# Patient Record
Sex: FEMALE | Race: WHITE | NOT HISPANIC OR LATINO | Employment: FULL TIME | URBAN - METROPOLITAN AREA
[De-identification: names, ages, dates, MRNs, and addresses within clinical notes are randomized per-mention and may not be internally consistent; named-entity substitution may affect disease eponyms.]

---

## 2017-02-14 ENCOUNTER — TELEPHONE (OUTPATIENT)
Dept: FAMILY MEDICINE | Facility: CLINIC | Age: 42
End: 2017-02-14

## 2017-02-14 NOTE — LETTER
[address] [address]  [phone]        February 20, 2017    Carolina TERRELL Roula  [address] [facility] [address]          To whom it may concern.,    We are appealing the denial due to the patient has tried Proair HFA and I did choose YES on the form saying this but I still got a denial.  Please see second page of the prior auth I submitted saying yes to that question    Sincerely,        Jim Royal M.D.

## 2017-02-14 NOTE — TELEPHONE ENCOUNTER
Drug not covered/PA required on Ventolin([id number]) Insurance covers Proair but patient said she failed on Proair.  Pt Insurance [employer]  Phone number for help desk [phone]  Pt I.D. Number [id number]  Group-[id number]  PCN-ADV  Bin#[id number]  Please advise pharmacy upon approval/denial.  Thanks,  Nikki Avila [facility].

## 2017-05-22 ENCOUNTER — TELEPHONE (OUTPATIENT)
Dept: FAMILY MEDICINE | Facility: CLINIC | Age: 42
End: 2017-05-22

## 2017-05-22 NOTE — TELEPHONE ENCOUNTER
Spoke with patient and gathered information per DW.  Warm to the touch only when there is swelling which comes and goes.  It is only her right leg.  No streak but is red when it's swollen.  Her brother has had blood clot after getting hit with a softball.  Her swelling came on 05/22/17 around 2:30 pm at work and is now going down (05/22/17 5:40pm).  Her swelling of her leg has been going on for a year and a half.  I spoke with Dr. Meyer and he told me to tell her that she can have a baby aspirin and do some leg pumps.  She was scheduled for 05/26/17 and is now scheduled for 05/23/17.  Ishaan Herrera, CMA

## 2017-05-23 ENCOUNTER — HOSPITAL ENCOUNTER (OUTPATIENT)
Dept: ULTRASOUND IMAGING | Facility: CLINIC | Age: 42
Discharge: HOME | End: 2017-05-23
Attending: FAMILY MEDICINE | Admitting: FAMILY MEDICINE
Payer: COMMERCIAL

## 2017-05-23 ENCOUNTER — OFFICE VISIT (OUTPATIENT)
Dept: FAMILY MEDICINE | Facility: CLINIC | Age: 42
End: 2017-05-23
Payer: COMMERCIAL

## 2017-05-23 VITALS
HEIGHT: 65 IN | RESPIRATION RATE: 14 BRPM | WEIGHT: 128 LBS | DIASTOLIC BLOOD PRESSURE: 68 MMHG | OXYGEN SATURATION: 99 % | TEMPERATURE: 97.6 F | HEART RATE: 83 BPM | SYSTOLIC BLOOD PRESSURE: 108 MMHG | BODY MASS INDEX: 21.33 KG/M2

## 2017-05-23 DIAGNOSIS — J45.20 INTERMITTENT ASTHMA, UNCOMPLICATED: ICD-10-CM

## 2017-05-23 DIAGNOSIS — M79.89 RIGHT LEG SWELLING: Primary | ICD-10-CM

## 2017-05-23 DIAGNOSIS — M79.89 RIGHT LEG SWELLING: ICD-10-CM

## 2017-05-23 LAB
D DIMER PPP FEU-MCNC: 0.3 UG/ML FEU (ref 0–0.5)
ERYTHROCYTE [SEDIMENTATION RATE] IN BLOOD BY WESTERGREN METHOD: 5 MM/H (ref 0–20)

## 2017-05-23 PROCEDURE — 85379 FIBRIN DEGRADATION QUANT: CPT | Performed by: FAMILY MEDICINE

## 2017-05-23 PROCEDURE — 93971 EXTREMITY STUDY: CPT | Mod: RT

## 2017-05-23 PROCEDURE — 99214 OFFICE O/P EST MOD 30 MIN: CPT | Performed by: FAMILY MEDICINE

## 2017-05-23 PROCEDURE — 36415 COLL VENOUS BLD VENIPUNCTURE: CPT | Performed by: FAMILY MEDICINE

## 2017-05-23 PROCEDURE — 85652 RBC SED RATE AUTOMATED: CPT | Performed by: FAMILY MEDICINE

## 2017-05-23 RX ORDER — ALBUTEROL SULFATE 90 UG/1
2 AEROSOL, METERED RESPIRATORY (INHALATION) EVERY 4 HOURS PRN
Qty: 1 INHALER | Refills: 6 | Status: SHIPPED | OUTPATIENT
Start: 2017-05-23 | End: 2018-01-05

## 2017-05-23 ASSESSMENT — PAIN SCALES - GENERAL: PAINLEVEL: MILD PAIN (2)

## 2017-05-23 NOTE — PROGRESS NOTES
"  SUBJECTIVE:                                                    Carolina Celeste is a 41 year old female who presents to clinic today for the following health issues:    Swelling in calf on right leg comes and goes a year now and this time its lasting a week last night it got really big thought it was going to explode.    Carolina is here today for right leg swelling.  Stated that she has had it on and off for more than a year, it was very bad in the last week. Last night, it was swelling badly and was feeling like it was exploding and painful.  Has jorge horse pain on the back of the right calf.  No knee pain.  Feel little better today. Stated that she feels like there is a knot on the calf where she has the pain.  Pain is worse with prolonged sitting. 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  Not recent hx of flying.  No smoking and is not on estrogen.  Brother had blood clot at 44, after trauma.  Genetic work up was negative.  Pain has gotten worse in the last couple weeks which was intermittently but now she has constant dull achy pain.  No unusal activities.  No hx of knee injury.  No back or hip pain.  Last night she felt stabbing the pain in the epigastric area that last for about 20 minutes. Mainly in the lower sternal and epigastric area. No bloating.  Also felt SOB and was feeling like \"I can't have enough oxygen\" when breathing.  Get nervous and anxious.  Also was sweating all night long last night. No hx of anxiety.  Known to have asthma - and her sob is different from her normal sob from asthma. No N/V/D/C. No URI symptoms. No ST or sinus pressure/pain. Known to have anxiety but she does not think she has anxiety attack.    Problem list and histories reviewed & adjusted, as indicated.  Additional history: as documented    Current Outpatient Prescriptions   Medication Sig Dispense Refill     VENTOLIN  (90 BASE) MCG/ACT Inhaler Inhale 2 puffs into the lungs every 4 hours as needed for shortness of " "breath / dyspnea or wheezing . Appointment needed for additional refills. 1 Inhaler 6     [DISCONTINUED] albuterol (VENTOLIN HFA) 108 (90 BASE) MCG/ACT inhaler Inhale 2 puffs into the lungs every 4 hours as needed for shortness of breath / dyspnea or wheezing . Appointment needed for additional refills. 1 Inhaler 6     Allergies   Allergen Reactions     Sunflower Seed [Sunflower Oil]      Throat itching, wheezing     Latex        Reviewed and updated as needed this visit by clinical staff  Tobacco  Allergies  Meds  Soc Hx      Reviewed and updated as needed this visit by Provider         ROS:  Constitutional, HEENT, cardiovascular, pulmonary, gi and gu systems are negative, except as otherwise noted.    OBJECTIVE:                                                    /68 (BP Location: Right arm, Patient Position: Chair, Cuff Size: Adult Regular)  Pulse 83  Temp 97.6  F (36.4  C) (Temporal)  Resp 14  Ht 5' 5\" (1.651 m)  Wt 128 lb (58.1 kg)  LMP 03/07/2009  SpO2 99%  BMI 21.3 kg/m2  Body mass index is 21.3 kg/(m^2).  GENERAL: healthy, alert and no distress. Speak in full sentences.  HENT: ear canals and TM's normal. Nares are non-congested. Oropharynx is pink and moist. No tender with palpation to the sinuses.  NECK: no adenopathy, supple and thyroid normal to palpation  RESP: lungs clear to auscultation - no rales, rhonchi or wheezes.  Good respiratory effort with good air movement throughout.  No tender with palpation to the chest wall.  CV: regular rate and rhythm, no murmur, no peripheral edema and peripheral pulses strong  ABDOMEN: soft, nontender, non-distended and bowel sounds normal. No tender with palpation to the epigastric area.   MS: no gross musculoskeletal defects noted, no edema.  Walks without limping.  No swelling on the legs and both calves are symmetrical in size.  Tender with palpation to the right calf medially and behind the the calf.  Negative Holmer's test.  Knees and ankles exams " were normal.  Strong pedal pulses bilaterally.  No tender with palpation to the thighs.  SKIN: no suspicious lesions or rashes  NEURO: Normal strength and tone, mentation intact and speech normal.  Cranial nerve 2-12 are intact. No focal neurological deficit. DTR + through out.  PSYCH: mentation appears normal, affect normal/bright.      Diagnostic Test Results:  Results for orders placed or performed in visit on 05/23/17 (from the past 24 hour(s))   D dimer, quantitative   Result Value Ref Range    D Dimer 0.3 0.0 - 0.50 ug/ml FEU   ESR: Erythrocyte sedimentation rate   Result Value Ref Range    Sed Rate 5 0 - 20 mm/h        Received a call from the Ultrasound tech - ultrasound was negative for DVT.    ASSESSMENT/PLAN:                                                        ICD-10-CM    1. Right leg swelling M79.89 D dimer, quantitative     ESR: Erythrocyte sedimentation rate     US Lower Extremity Venous Duplex Right     CANCELED: US Lower Extremity Venous Duplex Bilateral   2. Intermittent asthma, uncomplicated J45.20 VENTOLIN  (90 BASE) MCG/ACT Inhaler     Carolina presented with right leg swelling with chest pain and SOB.  Has been having the leg swelling on and off for years. No risk identified for DVT/PE except of daily long distance driving.  She also has a brother who had PE after trauma with negative genetic work up. Physical exam is not suggestive of DVT, more suggestive of Baker cyst or ruptured Baker cyst.  Labs as ordered and ultrasound was ordered to evaluate for DVT.    Labs showed normal sed rate and D-dimer.  Ultrasound was negative for DVT.  With negative D-dimer, unlikely she has PE. He chest pain and SOB could be due to anxiety attack.  The ultrasound tech reported that she was feeling SOB and therefore she was asked to go to the ER for further evaluation.      Ventolin inhaler refilled to be taken as needed for asthma symptoms.      Justina Dhaliwal Mai, MD  [facility]

## 2017-05-23 NOTE — NURSING NOTE
"Chief Complaint   Patient presents with     Leg Swelling       Initial /68 (BP Location: Right arm, Patient Position: Chair, Cuff Size: Adult Regular)  Pulse 83  Temp 97.6  F (36.4  C) (Temporal)  Resp 14  Ht 5' 5\" (1.651 m)  Wt 128 lb (58.1 kg)  LMP 03/07/2009  SpO2 99%  BMI 21.3 kg/m2 Estimated body mass index is 21.3 kg/(m^2) as calculated from the following:    Height as of this encounter: 5' 5\" (1.651 m).    Weight as of this encounter: 128 lb (58.1 kg).  Medication Reconciliation: complete     Temi Aj MA 5/23/2017        "

## 2017-05-23 NOTE — MR AVS SNAPSHOT
"              After Visit Summary   5/23/2017    Carolina Celeste    MRN: [id number]           Patient Information     Date Of Birth          1975        Visit Information        Provider Department      5/23/2017 3:20 PM Justina Skinner MD [facility]        Today's Diagnoses     Right leg swelling    -  1    Intermittent asthma, uncomplicated           Follow-ups after your visit        Follow-up notes from your care team     Return if symptoms worsen or fail to improve.      Future tests that were ordered for you today     Open Future Orders        Priority Expected Expires Ordered    US Lower Extremity Venous Duplex Right Routine  5/23/2018 5/23/2017            Who to contact     If you have questions or need follow up information about today's clinic visit or your schedule please contact [facility] directly at [phone].  Normal or non-critical lab and imaging results will be communicated to you by MyChart, letter or phone within 4 business days after the clinic has received the results. If you do not hear from us within 7 days, please contact the clinic through MyChart or phone. If you have a critical or abnormal lab result, we will notify you by phone as soon as possible.  Submit refill requests through [email or web address] or call your pharmacy and they will forward the refill request to us. Please allow 3 business days for your refill to be completed.          Additional Information About Your Visit        MyChart Information     [email or web address] lets you send messages to your doctor, view your test results, renew your prescriptions, schedule appointments and more. To sign up, go to [email or web address] . Click on \"Log in\" on the left side of the screen, which will take you to the Welcome page. Then click on \"Sign up Now\" on the right side of the page.     You will be asked to enter the access code listed below, as well as some personal information. Please follow the directions to create " "your username and password.     Your access code is: [email or web address]  Expires: 2017  8:56 PM     Your access code will  in 90 days. If you need help or a new code, please call your [facility] clinic or [phone].        Care EveryWhere ID     This is your Care EveryWhere ID. This could be used by other organizations to access your [facility] medical records  [id number]        Your Vitals Were     Pulse Temperature Respirations Height Last Period Pulse Oximetry    83 97.6  F (36.4  C) (Temporal) 14 5' 5\" (1.651 m) 2009 99%    BMI (Body Mass Index)                   21.3 kg/m2            Blood Pressure from Last 3 Encounters:   17 108/68   16 110/80   16 110/72    Weight from Last 3 Encounters:   17 128 lb (58.1 kg)   16 121 lb (54.9 kg)   07/10/16 120 lb (54.4 kg)              We Performed the Following     D dimer, quantitative     ESR: Erythrocyte sedimentation rate          Today's Medication Changes          These changes are accurate as of: 17  8:56 PM.  If you have any questions, ask your nurse or doctor.               Start taking these medicines.        Dose/Directions    VENTOLIN  (90 BASE) MCG/ACT Inhaler   Used for:  Intermittent asthma, uncomplicated   Generic drug:  albuterol   Replaces:  albuterol 108 (90 BASE) MCG/ACT Inhaler   Started by:  Justina Skinner MD        Dose:  2 puff   Inhale 2 puffs into the lungs every 4 hours as needed for shortness of breath / dyspnea or wheezing . Appointment needed for additional refills.   Quantity:  1 Inhaler   Refills:  6         Stop taking these medicines if you haven't already. Please contact your care team if you have questions.     albuterol 108 (90 BASE) MCG/ACT Inhaler   Commonly known as:  VENTOLIN HFA   Replaced by:  VENTOLIN  (90 BASE) MCG/ACT Inhaler   Stopped by:  Justina Skinner MD                Where to get your medicines      Some of these will need a paper prescription and others can be " bought over the counter.  Ask your nurse if you have questions.     Bring a paper prescription for each of these medications     VENTOLIN  (90 BASE) MCG/ACT Inhaler                Primary Care Provider Office Phone # Fax #    Florence Barrera PA-C [phone]       [address] [place] [address]        Thank you!     Thank you for choosing [facility]  for your care. Our goal is always to provide you with excellent care. Hearing back from our patients is one way we can continue to improve our services. Please take a few minutes to complete the written survey that you may receive in the mail after your visit with us. Thank you!             Your Updated Medication List - Protect others around you: Learn how to safely use, store and throw away your medicines at www.disposemymeds.org.          This list is accurate as of: 5/23/17  8:56 PM.  Always use your most recent med list.                   Brand Name Dispense Instructions for use    VENTOLIN  (90 BASE) MCG/ACT Inhaler   Generic drug:  albuterol     1 Inhaler    Inhale 2 puffs into the lungs every 4 hours as needed for shortness of breath / dyspnea or wheezing . Appointment needed for additional refills.

## 2017-05-24 ENCOUNTER — TELEPHONE (OUTPATIENT)
Dept: FAMILY MEDICINE | Facility: CLINIC | Age: 42
End: 2017-05-24

## 2017-05-24 ENCOUNTER — OFFICE VISIT (OUTPATIENT)
Dept: FAMILY MEDICINE | Facility: CLINIC | Age: 42
End: 2017-05-24
Payer: COMMERCIAL

## 2017-05-24 VITALS
WEIGHT: 127.6 LBS | HEART RATE: 84 BPM | SYSTOLIC BLOOD PRESSURE: 112 MMHG | TEMPERATURE: 98.3 F | RESPIRATION RATE: 20 BRPM | OXYGEN SATURATION: 100 % | DIASTOLIC BLOOD PRESSURE: 76 MMHG | BODY MASS INDEX: 21.23 KG/M2

## 2017-05-24 DIAGNOSIS — R06.02 SHORTNESS OF BREATH: Primary | ICD-10-CM

## 2017-05-24 DIAGNOSIS — R07.89 ATYPICAL CHEST PAIN: ICD-10-CM

## 2017-05-24 PROCEDURE — 99214 OFFICE O/P EST MOD 30 MIN: CPT | Performed by: FAMILY MEDICINE

## 2017-05-24 ASSESSMENT — PAIN SCALES - GENERAL: PAINLEVEL: MODERATE PAIN (4)

## 2017-05-24 NOTE — PROGRESS NOTES
"  SUBJECTIVE:                                                    Carolina Celeste is a 41 year old female who presents to clinic today for the following health issues:    Chief Complaint   Patient presents with     Shortness of Breath     states that it started 05/22/2017, it began with swelling in the calves.  She states that when she has shortness of breath she gets a pain that goes up into her neck,  She states that she does have asthma but it doensn't feel like this.  She did see Dr. kruger yesterday.  Had a US on her right leg.  States that her breathing has gotten worse since yesterday.      Other     walking stats were 97% and Pulse at 110        Cough  Several days worsening MEJIA  \"chest expanding, lungs not filling\" can't catch breath  Better with albuterol minutes, asthma hospitalizations  L, now R rib pain, sharp, lasts few sec's  Pain also there all the time, more intense with breathing, radiates into the R chest and r neck  Waking from sleep  r leg swelling, better today. Pain in R calf post  Started Monday 230, better by 4  Breathing is episodic, has to make up breathing      ROS:  Constitutional, HEENT, cardiovascular, pulmonary, gi and gu systems are negative, except as otherwise noted.    OBJECTIVE:                                                    /76 (BP Location: Right arm, Patient Position: Chair, Cuff Size: Adult Regular)  Pulse 84  Temp 98.3  F (36.8  C) (Temporal)  Resp 20  Wt 127 lb 9.6 oz (57.9 kg)  LMP 03/07/2009  SpO2 100%  BMI 21.23 kg/m2  Body mass index is 21.23 kg/(m^2).    Well, anxious. Neck supple. No simon. No tm. Lucius pharynx normal. Heart rrr, no m/r/g. Lungs clear, unlabored. abd benign. Ext's no c/c/e. No tenderness. Neuro non focal.     Reviewed her labs from yesterday and US.     Diagnostic Test Results:  none      ASSESSMENT/PLAN:                                                        ICD-10-CM    1. Shortness of breath R06.02    2. Atypical chest pain R07.89  " "    After I obtained a history and finished her exam I started explaining what workup I would suggest we pursue. I started discussing an EKG, which she immediately said \"this is not my heart, I don't want to pay for a heart workup\". She grew quite emotional. She seemed irritated by her discussion. I asked her what's wrong, and she said \"you think I'm making this up\". I reassured her that I take these type of symptoms very seriously and that's why would suggest a workup that would include imaging and lab work today. She became tearful and again asserted that I didn't believe her complaints and the left the exam room abruptly. We spent over 25 minutes together going over all of the above items, but unfortunately was unable to discuss a treatment plan..    Von Alvarez MD  [facility]     "

## 2017-05-24 NOTE — NURSING NOTE
"Chief Complaint   Patient presents with     Shortness of Breath     states that it started 05/22/2017, it began with swelling in the calves.  She states that when she has shortness of breath she gets a pain that goes up into her neck,  She states that she does have asthma but it doensn't feel like this.  She did see Dr. kruger yesterday.  Had a US on her right leg.  States that her breathing has gotten worse since yesterday.        Initial /76 (BP Location: Right arm, Patient Position: Chair, Cuff Size: Adult Regular)  Pulse 84  Temp 98.3  F (36.8  C) (Temporal)  Resp 20  Wt 127 lb 9.6 oz (57.9 kg)  LMP 03/07/2009  SpO2 100%  BMI 21.23 kg/m2 Estimated body mass index is 21.23 kg/(m^2) as calculated from the following:    Height as of 5/23/17: 5' 5\" (1.651 m).    Weight as of this encounter: 127 lb 9.6 oz (57.9 kg).  Medication Reconciliation: complete   Niki Caro CMA     "

## 2017-05-24 NOTE — MR AVS SNAPSHOT
"              After Visit Summary   5/24/2017    Carolina Celeste    MRN: [id number]           Patient Information     Date Of Birth          1975        Visit Information        Provider Department      5/24/2017 3:00 PM Von Alvarez MD [facility]        Today's Diagnoses     Shortness of breath    -  1    Atypical chest pain           Follow-ups after your visit        Future tests that were ordered for you today     Open Future Orders        Priority Expected Expires Ordered    US Lower Extremity Venous Duplex Right Routine  5/23/2018 5/23/2017            Who to contact     If you have questions or need follow up information about today's clinic visit or your schedule please contact [facility] directly at [phone].  Normal or non-critical lab and imaging results will be communicated to you by MyChart, letter or phone within 4 business days after the clinic has received the results. If you do not hear from us within 7 days, please contact the clinic through MyChart or phone. If you have a critical or abnormal lab result, we will notify you by phone as soon as possible.  Submit refill requests through [employer] or call your pharmacy and they will forward the refill request to us. Please allow 3 business days for your refill to be completed.          Additional Information About Your Visit        MyChart Information     [employer] lets you send messages to your doctor, view your test results, renew your prescriptions, schedule appointments and more. To sign up, go to [email or web address] . Click on \"Log in\" on the left side of the screen, which will take you to the Welcome page. Then click on \"Sign up Now\" on the right side of the page.     You will be asked to enter the access code listed below, as well as some personal information. Please follow the directions to create your username and password.     Your access code is: [email or web address]  Expires: 8/21/2017  8:56 PM   "   Your access code will  in 90 days. If you need help or a new code, please call your [facility] clinic or [phone].        Care EveryWhere ID     This is your Care EveryWhere ID. This could be used by other organizations to access your [facility] medical records  [id number]        Your Vitals Were     Pulse Temperature Respirations Last Period Pulse Oximetry BMI (Body Mass Index)    84 98.3  F (36.8  C) (Temporal) 20 2009 100% 21.23 kg/m2       Blood Pressure from Last 3 Encounters:   17 112/76   17 108/68   16 110/80    Weight from Last 3 Encounters:   17 127 lb 9.6 oz (57.9 kg)   17 128 lb (58.1 kg)   16 121 lb (54.9 kg)              Today, you had the following     No orders found for display       Primary Care Provider Office Phone # Fax #    Florence Barrera PA-C [phone]       [address] [place] [address]        Thank you!     Thank you for choosing [facility]  for your care. Our goal is always to provide you with excellent care. Hearing back from our patients is one way we can continue to improve our services. Please take a few minutes to complete the written survey that you may receive in the mail after your visit with us. Thank you!             Your Updated Medication List - Protect others around you: Learn how to safely use, store and throw away your medicines at www.disposemymeds.org.          This list is accurate as of: 17  5:39 PM.  Always use your most recent med list.                   Brand Name Dispense Instructions for use    VENTOLIN  (90 BASE) MCG/ACT Inhaler   Generic drug:  albuterol     1 Inhaler    Inhale 2 puffs into the lungs every 4 hours as needed for shortness of breath / dyspnea or wheezing . Appointment needed for additional refills.

## 2017-05-24 NOTE — TELEPHONE ENCOUNTER
": 1975  PHONE #'s: [phone] (home) NONE (work)    PRESENTING PROBLEM:  Still having issues from yesterday and now has a cough.     NURSING ASSESSMENT  Description:   \" I am asthmatic and this is not regular asthma attack. \"  New Sx since yesterday. Having some groin pain today, too. '   Onset/duration:  3 days.   Precip. factors:   Etiology unknown.   Assoc. Sx:   ' I feel like I can't get enough air, Feel lightheaded at times.   Improves/worsens Sx:  Worse today. , but leg feels better.   Pain scale (1-10)   Headache since yesterday,   Sx specific meds:  Albuteral, baby ASA per day since Monday.   LMP/preg/breast feeding:  Hx of Hysterectomy.   Last exam/Tx:   17 with DR. Skinner    RECOMMENDED DISPOSITION:  See in 24 hours - Wants to be seen today. Scheduled hernan with DR. Alvarez today at 3 PM.  Will comply with recommendation: YES   If further questions/concerns or if Sx do not improve, worsen or new Sx develop, call your PCP or [facility] as soon as possible.    NOTES:  Disposition was determined by the first positive assessment question, therefore all previous assessment questions were negative.  Informed to check provider manual or call insurance company to assure coverage.    Guideline used: Breathing Problems  Telephone Triage Protocols for Nurses, Fifth Edition, Steffany Collazo RN    "

## 2017-05-25 ENCOUNTER — TELEPHONE (OUTPATIENT)
Dept: FAMILY MEDICINE | Facility: CLINIC | Age: 42
End: 2017-05-25

## 2017-05-25 NOTE — TELEPHONE ENCOUNTER
Per Dr. Skinner, patient is already aware of the results and was seen by Dr. Alvarez on 5/24/17.  Conor Braga CMA

## 2017-05-25 NOTE — TELEPHONE ENCOUNTER
Tried to reach patient, left message for patient to call the clinic back.  Conor Braga, [facility]

## 2017-05-25 NOTE — TELEPHONE ENCOUNTER
----- Message from Justina Dhaliwal Mai, MD sent at 5/24/2017  1:53 PM CDT -----  Please let patient know that her d-dimer level was normal as well as her sedimentation rate.  If she continues to have the shortness of breath, please follow up for further evaluation.  Go to the ER if develops chest pain, shortness of breath or if there is any concerns.

## 2017-05-25 NOTE — TELEPHONE ENCOUNTER
----- Message from Justina Dhaliwal Mai, MD sent at 5/24/2017  2:14 PM CDT -----  Please let patient know that her ultrasound to evaluate for DVT was normal.

## 2017-07-27 ENCOUNTER — OFFICE VISIT (OUTPATIENT)
Dept: FAMILY MEDICINE | Facility: OTHER | Age: 42
End: 2017-07-27
Payer: COMMERCIAL

## 2017-07-27 ENCOUNTER — TELEPHONE (OUTPATIENT)
Dept: FAMILY MEDICINE | Facility: OTHER | Age: 42
End: 2017-07-27

## 2017-07-27 VITALS
WEIGHT: 125.6 LBS | TEMPERATURE: 98.2 F | HEIGHT: 65 IN | SYSTOLIC BLOOD PRESSURE: 124 MMHG | HEART RATE: 94 BPM | DIASTOLIC BLOOD PRESSURE: 60 MMHG | BODY MASS INDEX: 20.93 KG/M2 | RESPIRATION RATE: 16 BRPM

## 2017-07-27 DIAGNOSIS — F43.21 GRIEF REACTION: Primary | ICD-10-CM

## 2017-07-27 DIAGNOSIS — F41.1 GENERALIZED ANXIETY DISORDER: ICD-10-CM

## 2017-07-27 PROCEDURE — 99213 OFFICE O/P EST LOW 20 MIN: CPT | Performed by: FAMILY MEDICINE

## 2017-07-27 RX ORDER — BUPROPION HYDROCHLORIDE 150 MG/1
150 TABLET ORAL EVERY MORNING
Qty: 30 TABLET | Refills: 1 | Status: SHIPPED | OUTPATIENT
Start: 2017-07-27 | End: 2017-11-29

## 2017-07-27 RX ORDER — HYDROXYZINE HYDROCHLORIDE 25 MG/1
25-50 TABLET, FILM COATED ORAL EVERY 6 HOURS PRN
Qty: 30 TABLET | Refills: 1 | Status: SHIPPED | OUTPATIENT
Start: 2017-07-27 | End: 2019-11-27

## 2017-07-27 ASSESSMENT — PAIN SCALES - GENERAL: PAINLEVEL: NO PAIN (0)

## 2017-07-27 NOTE — TELEPHONE ENCOUNTER
Reason for call:  Patient reporting a symptom    Symptom or request: anxiety / panic attaches     Duration (how long have symptoms been present): ?    Have you been treated for this before? No    Additional comments: anxiety / transferred patient to RN    Phone Number patient can be reached at:  Home number on file [phone] (home) or Cell number on file:    Telephone Information:   Mobile [phone]       Best Time:  Any time    Can we leave a detailed message on this number:  YES    Call taken on 7/27/2017 at 1:11 PM by Rosemary Delgadillo

## 2017-07-27 NOTE — PATIENT INSTRUCTIONS
Grief Reaction  Grief is the feeling that we all have when we lose someone or something that has been important in our life. Grief is an unavoidable and normal reaction to this loss, and can last from months to years. The amount of time depends on different factors. These include how close the person was to you, and how much support you have through the grief process. Symptoms can be both physical and emotional.  Physical reactions:    Loss of appetite or overeating    Changes in weight    Trouble getting to sleep or staying asleep    Hair loss    Upset stomach, indigestion, heart burn, abdominal pain, cramping, diarrhea    Sense of trouble breathing    Trembling, shakiness  Emotional reactions:    Sadness    Anxiety    Feeling depressed or helpless    Difficulty concentrating    Detachment or withdrawal from those around you    Loss of interest in your normal life and work  Home care    Allow yourself to feel the pain of your loss. For some, this can be a key part of healing grief. Talk about your pain with others who understand. Share good memories that involve the person, pet, or possession  you lost.    Take time for yourself. Make it a point to do things that you enjoy (gardening, walking in nature, going to a movie, etc.).    Take care of your physical body. Eat a balanced diet (low in saturated fat and high in fruits and vegetables) and establish an exercise plan at least 3 times a week for 30 minutes. Even mild-moderate exercise (like brisk walking) can make you feel better. Get plenty of sleep.    Avoid the use of alcohol and drugs to cover your emotional pain. This only slows down the emotional healing process.    Do not isolate yourself from others. Have daily contact with family or friends. Talk about your loss to those closest to you.    For additional support, meet with your //rabbi, a counselor or therapist, or your own doctor.    Consider joining a grief support group. Ask your doctor  or our staff for information on how to find one in your area.    If you have been prescribed a medicine to help with your symptoms, take it only as directed. Do not use it with alcohol.  Follow-up care  Follow up with your healthcare provider, or as advised.  Call 911  Call 911 if any of these occur:    Trouble breathing    Very confused    Very drowsy or trouble awakening    Fainting or loss of consciousness    Rapid heart rate    Seizure    New chest pain that becomes more severe, lasts longer, or spreads into your shoulder, arm, neck, jaw or back  When to seek medical advice  Call your healthcare provider right away if any of these occur:    Worsening symptoms    Unable to eat or sleep for three days in a row    Feeling extreme depression, fear, anxiety, or anger toward yourself or others    Feeling out of control    Feeling that you may try to harm yourself    family or friends express concern over your behavior and ask you to get help  Date Last Reviewed: 9/29/2015 2000-2017 [employer]. [address] [place] [address]. All rights reserved. This information is not intended as a substitute for professional medical care. Always follow your healthcare professional's instructions.        Treating Anxiety Disorders with Therapy    If you have an anxiety disorder, you don t have to suffer anymore. Treatment is available. Therapy (also called counseling) is often a helpful treatment for anxiety disorders. With therapy, a specially trained professional (therapist) helps you face and learn to manage your anxiety. Therapy can be short-term or long-term depending on your needs. In some cases, medicine may also be prescribed with therapy. It may take time before you notice how much therapy is helping, but stick with it. With therapy, you can feel better.  Cognitive behavioral therapy (CBT)  Cognitive behavioral therapy (CBT) teaches you to manage anxiety. It does this by helping you understand  how you think and act when you re anxious. Research has shown CBT to be a very effective treatment for anxiety disorders. How CBT is run is almost like a class. It involves homework and activities to build skills that teach you to cope with anxiety step by step. It can be done in a group or one-on-one, and often takes place for a set number of sessions. CBT has two main parts:    Cognitive therapy helps you identify the negative, irrational thoughts that occur with your anxiety. You ll learn to replace these with more positive, realistic thoughts.    Behavioral therapy helps you change how you react to anxiety. You ll learn coping skills and methods for relaxing to help you better deal with anxiety.  Other forms of therapy  Other therapy methods may work better for you than CBT. Or, you may move from CBT to another form of therapy as your treatment needs change. This may mean meeting with a therapist by yourself or in a group. Therapy can also help you work through problems in your life, such as drug or alcohol dependence, that may be making your anxiety worse.  Getting better takes time  Therapy will help you feel better and teach you skills to help manage anxiety long term. But change doesn t happen right away. It takes a commitment from you. And treatment only works if you learn to face the causes of your anxiety. So, you might feel worse before you feel better. This can sometimes make it hard to stick with it. But remember: Therapy is a very effective treatment. The results will be well worth it.  Helping yourself  If anxiety is wearing you down, here are some things you can do to cope:    Check with your doctor and rule out any physical problems that may be causing the anxiety symptoms.    If an anxiety disorder is diagnosed, seek mental healthcare. This is an illness and it can respond to treatment. Most types of anxiety disorders will respond to talk therapy and medicine.    Educate yourself about anxiety  disorders. Keep track of helpful online resources and books you can use during stressful periods.    Try stress management techniques such as meditation.    Consider online or in-person support groups.    Don t fight your feelings. Anxiety feeds itself. The more you worry about it, the worse it gets. Instead, try to identify what might have triggered your anxiety. Then try to put this threat in perspective.    Keep in mind that you can t control everything about a situation. Change what you can and let the rest take its course.    Exercise -- it s a great way to relieve tension and help your body feel relaxed.    Examine your life for stress, and try to find ways to reduce it.    Avoid caffeine and nicotine, which can make anxiety symptoms worse.    Fight the temptation to turn to alcohol or unprescribed drugs for relief. They only make things worse in the long run.   Date Last Reviewed: 1/1/2017 2000-2017 [employer]. [address] [place] [address], [place] [address]. All rights reserved. This information is not intended as a substitute for professional medical care. Always follow your healthcare professional's instructions.        Depression: Tips to Help Yourself  As your healthcare providers help treat your depression, you can also help yourself. Keep in mind that your illness affects you emotionally, physically, mentally, and socially. So full recovery will take time. Take care of your body and your soul, and be patient with yourself as you get better.    Self-care    Educate yourself. Read about treatment and medicine options. If you have the energy, attend local conferences or support groups. Keep a list of useful websites and helpful books and use them as needed. This illness is not your fault. Don t blame yourself for your depression.    Manage early symptoms. If you notice symptoms returning, experience triggers, or identify other factors that may lead to a depressive episode, get help as soon as  possible. Ask trusted friends and family to monitor your behavior and let you know if they see anything of concern.    Work with your provider. Find a provider you can trust. Communicate honestly with that person and share information on your treatment for depression and your reaction to medicines.    Be prepared for a crisis. Know what to do if you experience a crisis. Keep the phone number of a crisis hotline and know the location of your community's urgent care centers and the closest emergency department.    Hold off on big decisions. Depression can cloud your judgment. So wait until you feel better before making major life decisions, such as changing jobs, moving, or getting  or .    Be patient. Recovering from depression is a process. Don t be discouraged if it takes some time to feel better.    Keep it simple. Depression saps your energy and concentration. So you won t be able to do all the things you used to do. Set small goals and do what you can.    Be with others. Don t isolate yourself--you ll only feel worse. Try to be with other people. And take part in fun activities when you can. Go to a movie, ballgame, [employer] service, or social event. Talk openly with people you can trust. And accept help when it s offered.  Take care of your body  People with depression often lose the desire to take care of themselves. That only makes their problems worse. During treatment and afterward, make a point to:    Exercise. It s a great way to take care of your body. And studies have shown that exercise helps fight depression.    Avoid drugs and alcohol. These may ease the pain in the short term. But they ll only make your problems worse in the long run.    Get relief from stress. Ask your healthcare provider for relaxation exercises and techniques to help relieve stress.    Eat right. A balanced and healthy diet helps keep your body healthy.  Date Last Reviewed: 1/1/2017 2000-2017 [facility]  [employer]. [address] [place] [address], [place] [address]. All rights reserved. This information is not intended as a substitute for professional medical care. Always follow your healthcare professional's instructions.

## 2017-07-27 NOTE — MR AVS SNAPSHOT
After Visit Summary   7/27/2017    Carolina Celeste    MRN: [id number]           Patient Information     Date Of Birth          1975        Visit Information        Provider Department      7/27/2017 2:40 PM Abilio Duenas MD [facility]'s Diagnoses     Grief reaction    -  1    Generalized anxiety disorder          Care Instructions      Grief Reaction  Grief is the feeling that we all have when we lose someone or something that has been important in our life. Grief is an unavoidable and normal reaction to this loss, and can last from months to years. The amount of time depends on different factors. These include how close the person was to you, and how much support you have through the grief process. Symptoms can be both physical and emotional.  Physical reactions:    Loss of appetite or overeating    Changes in weight    Trouble getting to sleep or staying asleep    Hair loss    Upset stomach, indigestion, heart burn, abdominal pain, cramping, diarrhea    Sense of trouble breathing    Trembling, shakiness  Emotional reactions:    Sadness    Anxiety    Feeling depressed or helpless    Difficulty concentrating    Detachment or withdrawal from those around you    Loss of interest in your normal life and work  Home care    Allow yourself to feel the pain of your loss. For some, this can be a key part of healing grief. Talk about your pain with others who understand. Share good memories that involve the person, pet, or possession  you lost.    Take time for yourself. Make it a point to do things that you enjoy (gardening, walking in nature, going to a movie, etc.).    Take care of your physical body. Eat a balanced diet (low in saturated fat and high in fruits and vegetables) and establish an exercise plan at least 3 times a week for 30 minutes. Even mild-moderate exercise (like brisk walking) can make you feel better. Get plenty of sleep.    Avoid the use of alcohol  and drugs to cover your emotional pain. This only slows down the emotional healing process.    Do not isolate yourself from others. Have daily contact with family or friends. Talk about your loss to those closest to you.    For additional support, meet with your //rabbi, a counselor or therapist, or your own doctor.    Consider joining a grief support group. Ask your doctor or our staff for information on how to find one in your area.    If you have been prescribed a medicine to help with your symptoms, take it only as directed. Do not use it with alcohol.  Follow-up care  Follow up with your healthcare provider, or as advised.  Call 911  Call 911 if any of these occur:    Trouble breathing    Very confused    Very drowsy or trouble awakening    Fainting or loss of consciousness    Rapid heart rate    Seizure    New chest pain that becomes more severe, lasts longer, or spreads into your shoulder, arm, neck, jaw or back  When to seek medical advice  Call your healthcare provider right away if any of these occur:    Worsening symptoms    Unable to eat or sleep for three days in a row    Feeling extreme depression, fear, anxiety, or anger toward yourself or others    Feeling out of control    Feeling that you may try to harm yourself    family or friends express concern over your behavior and ask you to get help  Date Last Reviewed: 9/29/2015 2000-2017 [employer]. [address] [place] [id number]. All rights reserved. This information is not intended as a substitute for professional medical care. Always follow your healthcare professional's instructions.        Treating Anxiety Disorders with Therapy    If you have an anxiety disorder, you don t have to suffer anymore. Treatment is available. Therapy (also called counseling) is often a helpful treatment for anxiety disorders. With therapy, a specially trained professional (therapist) helps you face and learn to manage your  anxiety. Therapy can be short-term or long-term depending on your needs. In some cases, medicine may also be prescribed with therapy. It may take time before you notice how much therapy is helping, but stick with it. With therapy, you can feel better.  Cognitive behavioral therapy (CBT)  Cognitive behavioral therapy (CBT) teaches you to manage anxiety. It does this by helping you understand how you think and act when you re anxious. Research has shown CBT to be a very effective treatment for anxiety disorders. How CBT is run is almost like a class. It involves homework and activities to build skills that teach you to cope with anxiety step by step. It can be done in a group or one-on-one, and often takes place for a set number of sessions. CBT has two main parts:    Cognitive therapy helps you identify the negative, irrational thoughts that occur with your anxiety. You ll learn to replace these with more positive, realistic thoughts.    Behavioral therapy helps you change how you react to anxiety. You ll learn coping skills and methods for relaxing to help you better deal with anxiety.  Other forms of therapy  Other therapy methods may work better for you than CBT. Or, you may move from CBT to another form of therapy as your treatment needs change. This may mean meeting with a therapist by yourself or in a group. Therapy can also help you work through problems in your life, such as drug or alcohol dependence, that may be making your anxiety worse.  Getting better takes time  Therapy will help you feel better and teach you skills to help manage anxiety long term. But change doesn t happen right away. It takes a commitment from you. And treatment only works if you learn to face the causes of your anxiety. So, you might feel worse before you feel better. This can sometimes make it hard to stick with it. But remember: Therapy is a very effective treatment. The results will be well worth it.  Helping yourself  If anxiety  is wearing you down, here are some things you can do to cope:    Check with your doctor and rule out any physical problems that may be causing the anxiety symptoms.    If an anxiety disorder is diagnosed, seek mental healthcare. This is an illness and it can respond to treatment. Most types of anxiety disorders will respond to talk therapy and medicine.    Educate yourself about anxiety disorders. Keep track of helpful online resources and books you can use during stressful periods.    Try stress management techniques such as meditation.    Consider online or in-person support groups.    Don t fight your feelings. Anxiety feeds itself. The more you worry about it, the worse it gets. Instead, try to identify what might have triggered your anxiety. Then try to put this threat in perspective.    Keep in mind that you can t control everything about a situation. Change what you can and let the rest take its course.    Exercise -- it s a great way to relieve tension and help your body feel relaxed.    Examine your life for stress, and try to find ways to reduce it.    Avoid caffeine and nicotine, which can make anxiety symptoms worse.    Fight the temptation to turn to alcohol or unprescribed drugs for relief. They only make things worse in the long run.   Date Last Reviewed: 1/1/2017 2000-2017 [employer]. [address] [place] [address], [place] [address]. All rights reserved. This information is not intended as a substitute for professional medical care. Always follow your healthcare professional's instructions.        Depression: Tips to Help Yourself  As your healthcare providers help treat your depression, you can also help yourself. Keep in mind that your illness affects you emotionally, physically, mentally, and socially. So full recovery will take time. Take care of your body and your soul, and be patient with yourself as you get better.    Self-care    Educate yourself. Read about treatment and medicine  options. If you have the energy, attend local conferences or support groups. Keep a list of useful websites and helpful books and use them as needed. This illness is not your fault. Don t blame yourself for your depression.    Manage early symptoms. If you notice symptoms returning, experience triggers, or identify other factors that may lead to a depressive episode, get help as soon as possible. Ask trusted friends and family to monitor your behavior and let you know if they see anything of concern.    Work with your provider. Find a provider you can trust. Communicate honestly with that person and share information on your treatment for depression and your reaction to medicines.    Be prepared for a crisis. Know what to do if you experience a crisis. Keep the phone number of a crisis hotline and know the location of your community's urgent care centers and the closest emergency department.    Hold off on big decisions. Depression can cloud your judgment. So wait until you feel better before making major life decisions, such as changing jobs, moving, or getting  or .    Be patient. Recovering from depression is a process. Don t be discouraged if it takes some time to feel better.    Keep it simple. Depression saps your energy and concentration. So you won t be able to do all the things you used to do. Set small goals and do what you can.    Be with others. Don t isolate yourself--you ll only feel worse. Try to be with other people. And take part in fun activities when you can. Go to a movie, ballgame, Religion service, or social event. Talk openly with people you can trust. And accept help when it s offered.  Take care of your body  People with depression often lose the desire to take care of themselves. That only makes their problems worse. During treatment and afterward, make a point to:    Exercise. It s a great way to take care of your body. And studies have shown that exercise helps fight  depression.    Avoid drugs and alcohol. These may ease the pain in the short term. But they ll only make your problems worse in the long run.    Get relief from stress. Ask your healthcare provider for relaxation exercises and techniques to help relieve stress.    Eat right. A balanced and healthy diet helps keep your body healthy.  Date Last Reviewed: 1/1/2017 2000-2017 [employer] [address] [place] [address], [place] [address]. All rights reserved. This information is not intended as a substitute for professional medical care. Always follow your healthcare professional's instructions.                Follow-ups after your visit        Your next 10 appointments already scheduled     Aug 09, 2017  3:00 PM CDT   Office Visit with Lidia Núñez PA-C   [facility]    [address] [facility] [address] [phone]           Bring a current list of meds and any records pertaining to this visit. For Physicals, please bring immunization records and any forms needing to be filled out. Please arrive 10 minutes early to complete paperwork.              Who to contact     If you have questions or need follow up information about today's clinic visit or your schedule please contact [facility] directly at [phone].  Normal or non-critical lab and imaging results will be communicated to you by Acc[employer]asshart, letter or phone within 4 business days after the clinic has received the results. If you do not hear from us within 7 days, please contact the clinic through Acc[employer]asshart or phone. If you have a critical or abnormal lab result, we will notify you by phone as soon as possible.  Submit refill requests through MyPrintCloud or call your pharmacy and they will forward the refill request to us. Please allow 3 business days for your refill to be completed.          Additional Information About Your Visit        MyPrintCloud Information     MyPrintCloud lets you send  "messages to your doctor, view your test results, renew your prescriptions, schedule appointments and more. To sign up, go to [email or web address] . Click on \"Log in\" on the left side of the screen, which will take you to the Welcome page. Then click on \"Sign up Now\" on the right side of the page.     You will be asked to enter the access code listed below, as well as some personal information. Please follow the directions to create your username and password.     Your access code is: [email or web address]  Expires: 2017  8:56 PM     Your access code will  in 90 days. If you need help or a new code, please call your [facility] clinic or [phone].        Care EveryWhere ID     This is your Care EveryWhere ID. This could be used by other organizations to access your [facility] medical records  [id number]        Your Vitals Were     Pulse Temperature Respirations Height Last Period Breastfeeding?    94 98.2  F (36.8  C) (Temporal) 16 5' 5\" (1.651 m) 2009 No    BMI (Body Mass Index)                   20.9 kg/m2            Blood Pressure from Last 3 Encounters:   17 124/60   17 112/76   17 108/68    Weight from Last 3 Encounters:   17 125 lb 9.6 oz (57 kg)   17 127 lb 9.6 oz (57.9 kg)   17 128 lb (58.1 kg)              Today, you had the following     No orders found for display         Today's Medication Changes          These changes are accurate as of: 17  3:36 PM.  If you have any questions, ask your nurse or doctor.               Start taking these medicines.        Dose/Directions    buPROPion 150 MG 24 hr tablet   Commonly known as:  WELLBUTRIN XL   Used for:  Grief reaction, Generalized anxiety disorder   Started by:  Abilio Duenas MD        Dose:  150 mg   Take 1 tablet (150 mg) by mouth every morning   Quantity:  30 tablet   Refills:  1       hydrOXYzine 25 MG tablet   Commonly known as:  ATARAX   Used for:  Grief reaction, Generalized anxiety disorder "   Started by:  Abilio Duenas MD        Dose:  25-50 mg   Take 1-2 tablets (25-50 mg) by mouth every 6 hours as needed for anxiety   Quantity:  30 tablet   Refills:  1            Where to get your medicines      These medications were sent to [facility] - [id number]   [address] [place] [address]     Phone:  [phone]     buPROPion 150 MG 24 hr tablet    hydrOXYzine 25 MG tablet                Primary Care Provider Office Phone # Fax #    Florence TALIB Barrera PA-C [phone]       [facility] [address]   [place] [address]        Equal Access to Services     [facility] : Hadii michael ku hadasho Soomaali, waaxda luqadaha, qaybta kaalmada adeegyada, vickey villegasin haycherelle wright . So [facility] [phone].    ATENCIÓN: Si habla español, tiene a ji disposición servicios gratuitos de asistencia lingüística. Llame al [phone].    We comply with applicable federal civil rights laws and [place] laws. We do not discriminate on the basis of race, color, national origin, age, disability sex, sexual orientation or gender identity.            Thank you!     Thank you for choosing [facility]  for your care. Our goal is always to provide you with excellent care. Hearing back from our patients is one way we can continue to improve our services. Please take a few minutes to complete the written survey that you may receive in the mail after your visit with us. Thank you!             Your Updated Medication List - Protect others around you: Learn how to safely use, store and throw away your medicines at www.disposemymeds.org.          This list is accurate as of: 7/27/17  3:36 PM.  Always use your most recent med list.                   Brand Name Dispense Instructions for use Diagnosis    buPROPion 150 MG 24 hr tablet    WELLBUTRIN XL    30 tablet    Take 1 tablet (150 mg) by mouth every morning    Grief reaction,  Generalized anxiety disorder       hydrOXYzine 25 MG tablet    ATARAX    30 tablet    Take 1-2 tablets (25-50 mg) by mouth every 6 hours as needed for anxiety    Grief reaction, Generalized anxiety disorder       VENTOLIN  (90 BASE) MCG/ACT Inhaler   Generic drug:  albuterol     1 Inhaler    Inhale 2 puffs into the lungs every 4 hours as needed for shortness of breath / dyspnea or wheezing . Appointment needed for additional refills.    Intermittent asthma, uncomplicated

## 2017-07-27 NOTE — TELEPHONE ENCOUNTER
"Carolina Celeste is a 41 year old female  S-(situation): Pt is calling today with anxiety and panic attacks X 1 month    B-(background): Nephew  unexpectedly 1 month ago. Started about that time.    A-(assessment): Anxiety and panic attacks. Noticeably emotional during phone conversation.  Not suicidal. No intent to harm others.  Can't seem to get my emotions under control.  No hallucinations or paranoia.  Heart beating fast.  Some \"Chest pain but I know it's related to panic attacks\"  Very vulnerable feeling.  Daughter moving to college in 3 weeks.  So abnormal for me to feel like this.   Pain scale (1-10): 0/10    R-(recommendations): See in 4 hours, another person to drive - appt with Dr. Duenas for 2:40pm TODAY, 17.  Will comply with recommendation: yes   If further questions/concerns or if sxs do not improve, worsen or new sxs develop, call your PCP or [facility] as soon as possible.    Guideline used: Anxiety  Telephone Triage Protocols for Nurses, Fifth Edition, Steffany Hicks, RN, BSN      "

## 2017-07-27 NOTE — PROGRESS NOTES
SUBJECTIVE:                                                    Carolina Celeste is a 41 year old female who presents to clinic today for the following health issues:    Anxiety Follow-Up    Status since last visit: Worsened     Other associated symptoms:sadness     Complicating factors:   Significant life event: Yes-  Recent death of nephew, taking daughter to college in 2 weeks    Current substance abuse: None  Depression symptoms: No  SERGE-7 SCORE 9/18/2015   Total Score 9       GAD7      Amount of exercise or physical activity: None    Problems taking medications regularly: No    Medication side effects: none  Diet: regular (no restrictions)      PROBLEMS TO ADD ON...    Problem list and histories reviewed & adjusted, as indicated.  Additional history: as documented    Patient Active Problem List   Diagnosis     Allergic rhinitis     Generalized anxiety disorder     Intermittent asthma     Immunization not carried out because of patient refusal     Past Surgical History:   Procedure Laterality Date     LAPAROSCOPY,VAG HYSTERECTOMY  4/2009    supracervical hyst.       Social History   Substance Use Topics     Smoking status: Former Smoker     Packs/day: 0.10     Years: 4.00     Quit date: 12/31/2011     Smokeless tobacco: Never Used      Comment: Quit 2003     Alcohol use 0.0 oz/week     0 Standard drinks or equivalent per week      Comment: very little     Family History   Problem Relation Age of Onset     Lipids Father      Hypertension Father      Psychotic Disorder Father      [place]     Neurologic Disorder Maternal Grandmother      Parkinsons     Depression Maternal Grandmother      Hypertension Maternal Grandmother      Alzheimer Disease Maternal Grandfather      Genitourinary Problems Paternal Grandfather      Cirrohosis of LIver     Breast Cancer Other      CANCER Maternal Uncle      Stomach/Esophagus     Breast Cancer Maternal Aunt      CANCER Maternal Uncle      stomach     Cancer - colorectal No family  "hx of          Current Outpatient Prescriptions   Medication Sig Dispense Refill     buPROPion (WELLBUTRIN XL) 150 MG 24 hr tablet Take 1 tablet (150 mg) by mouth every morning 30 tablet 1     hydrOXYzine (ATARAX) 25 MG tablet Take 1-2 tablets (25-50 mg) by mouth every 6 hours as needed for anxiety 30 tablet 1     VENTOLIN  (90 BASE) MCG/ACT Inhaler Inhale 2 puffs into the lungs every 4 hours as needed for shortness of breath / dyspnea or wheezing . Appointment needed for additional refills. 1 Inhaler 6     Allergies   Allergen Reactions     Sunflower Seed [Sunflower Oil]      Throat itching, wheezing     Latex          Reviewed and updated as needed this visit by clinical staff     Reviewed and updated as needed this visit by Provider         ROS:  Constitutional, HEENT, cardiovascular, pulmonary, gi and gu systems are negative, except as otherwise noted.      OBJECTIVE:   /60  Pulse 94  Temp 98.2  F (36.8  C) (Temporal)  Resp 16  Ht 5' 5\" (1.651 m)  Wt 125 lb 9.6 oz (57 kg)  LMP 03/07/2009  Breastfeeding? No  BMI 20.9 kg/m2  Body mass index is 20.9 kg/(m^2).  GENERAL: healthy, alert and no distress  NECK: no adenopathy, no asymmetry, masses, or scars and thyroid normal to palpation  RESP: lungs clear to auscultation - no rales, rhonchi or wheezes  CV: regular rate and rhythm, normal S1 S2, no S3 or S4, no murmur, click or rub, no peripheral edema and peripheral pulses strong  ABDOMEN: soft, nontender, no hepatosplenomegaly, no masses and bowel sounds normal  MS: no gross musculoskeletal defects noted, no edema  NEURO: Normal strength and tone, mentation intact and speech normal  BACK: no CVA tenderness, no paralumbar tenderness  PSYCH: mentation appears normal, affect normal/bright    Diagnostic Test Results:  Results for orders placed or performed during the hospital encounter of 05/23/17   US Lower Extremity Venous Duplex Right    Narrative    ULTRASOUND VENOUS LOWER EXTREMITY UNILATERAL " RIGHT  5/23/2017 6:03 PM     HISTORY: Edema of right leg. Other specified soft tissue disorders.     COMPARISON: None.    TECHNIQUE: Ultrasound gray scale, Color Doppler flow, and spectral  Doppler waveform analysis performed.    FINDINGS: The right common femoral, superficial femoral, popliteal and  posterior tibial veins are patent and fully compressible and  demonstrate normal venous Doppler flow. The visualized greater  saphenous vein is negative for thrombus. For comparison, the left  common femoral vein was evaluated and was unremarkable.      Impression    IMPRESSION: No deep venous thrombosis demonstrated.    JHONATAN YEN MD       ASSESSMENT/PLAN:       ICD-10-CM    1. Grief reaction F43.20 buPROPion (WELLBUTRIN XL) 150 MG 24 hr tablet     hydrOXYzine (ATARAX) 25 MG tablet   2. Generalized anxiety disorder F41.1 buPROPion (WELLBUTRIN XL) 150 MG 24 hr tablet     hydrOXYzine (ATARAX) 25 MG tablet   The patient understood the rational for the diagnosis and treatment plan. All questions were answered to best of my ability and the patient's satisfaction.  Patient's Son did not tolerate SSRIs and would like to start with a different medication.  Reviewed concept of depression as function of biochemical imbalance of neurotransmitters/rationale for treatment.   Risks and benefits of medication(s) reviewed with patient. Questions answered.   Counseling advised  Patient instructed to call for significant side effects medications or problems   Patient advised immediate presentation to hospital for suicidal thought, etc.   Risks, benefits and alternatives of treatments discussed. Plan agreed on. Followup: 3 to 4 weeks.   Will call, return to clinic, or go to ED if worsening or symptoms not improving as discussed.   See patient instructions.     Patient Instructions       Grief Reaction  Grief is the feeling that we all have when we lose someone or something that has been important in our life. Grief is an unavoidable  and normal reaction to this loss, and can last from months to years. The amount of time depends on different factors. These include how close the person was to you, and how much support you have through the grief process. Symptoms can be both physical and emotional.  Physical reactions:    Loss of appetite or overeating    Changes in weight    Trouble getting to sleep or staying asleep    Hair loss    Upset stomach, indigestion, heart burn, abdominal pain, cramping, diarrhea    Sense of trouble breathing    Trembling, shakiness  Emotional reactions:    Sadness    Anxiety    Feeling depressed or helpless    Difficulty concentrating    Detachment or withdrawal from those around you    Loss of interest in your normal life and work  Home care    Allow yourself to feel the pain of your loss. For some, this can be a key part of healing grief. Talk about your pain with others who understand. Share good memories that involve the person, pet, or possession  you lost.    Take time for yourself. Make it a point to do things that you enjoy (gardening, walking in nature, going to a movie, etc.).    Take care of your physical body. Eat a balanced diet (low in saturated fat and high in fruits and vegetables) and establish an exercise plan at least 3 times a week for 30 minutes. Even mild-moderate exercise (like brisk walking) can make you feel better. Get plenty of sleep.    Avoid the use of alcohol and drugs to cover your emotional pain. This only slows down the emotional healing process.    Do not isolate yourself from others. Have daily contact with family or friends. Talk about your loss to those closest to you.    For additional support, meet with your //rabbi, a counselor or therapist, or your own doctor.    Consider joining a grief support group. Ask your doctor or our staff for information on how to find one in your area.    If you have been prescribed a medicine to help with your symptoms, take it only as  directed. Do not use it with alcohol.  Follow-up care  Follow up with your healthcare provider, or as advised.  Call 911  Call 911 if any of these occur:    Trouble breathing    Very confused    Very drowsy or trouble awakening    Fainting or loss of consciousness    Rapid heart rate    Seizure    New chest pain that becomes more severe, lasts longer, or spreads into your shoulder, arm, neck, jaw or back  When to seek medical advice  Call your healthcare provider right away if any of these occur:    Worsening symptoms    Unable to eat or sleep for three days in a row    Feeling extreme depression, fear, anxiety, or anger toward yourself or others    Feeling out of control    Feeling that you may try to harm yourself    family or friends express concern over your behavior and ask you to get help  Date Last Reviewed: 9/29/2015 2000-2017 [employer]. [address] [place] [address]. All rights reserved. This information is not intended as a substitute for professional medical care. Always follow your healthcare professional's instructions.        Treating Anxiety Disorders with Therapy    If you have an anxiety disorder, you don t have to suffer anymore. Treatment is available. Therapy (also called counseling) is often a helpful treatment for anxiety disorders. With therapy, a specially trained professional (therapist) helps you face and learn to manage your anxiety. Therapy can be short-term or long-term depending on your needs. In some cases, medicine may also be prescribed with therapy. It may take time before you notice how much therapy is helping, but stick with it. With therapy, you can feel better.  Cognitive behavioral therapy (CBT)  Cognitive behavioral therapy (CBT) teaches you to manage anxiety. It does this by helping you understand how you think and act when you re anxious. Research has shown CBT to be a very effective treatment for anxiety disorders. How CBT is run is almost  like a class. It involves homework and activities to build skills that teach you to cope with anxiety step by step. It can be done in a group or one-on-one, and often takes place for a set number of sessions. CBT has two main parts:    Cognitive therapy helps you identify the negative, irrational thoughts that occur with your anxiety. You ll learn to replace these with more positive, realistic thoughts.    Behavioral therapy helps you change how you react to anxiety. You ll learn coping skills and methods for relaxing to help you better deal with anxiety.  Other forms of therapy  Other therapy methods may work better for you than CBT. Or, you may move from CBT to another form of therapy as your treatment needs change. This may mean meeting with a therapist by yourself or in a group. Therapy can also help you work through problems in your life, such as drug or alcohol dependence, that may be making your anxiety worse.  Getting better takes time  Therapy will help you feel better and teach you skills to help manage anxiety long term. But change doesn t happen right away. It takes a commitment from you. And treatment only works if you learn to face the causes of your anxiety. So, you might feel worse before you feel better. This can sometimes make it hard to stick with it. But remember: Therapy is a very effective treatment. The results will be well worth it.  Helping yourself  If anxiety is wearing you down, here are some things you can do to cope:    Check with your doctor and rule out any physical problems that may be causing the anxiety symptoms.    If an anxiety disorder is diagnosed, seek mental healthcare. This is an illness and it can respond to treatment. Most types of anxiety disorders will respond to talk therapy and medicine.    Educate yourself about anxiety disorders. Keep track of helpful online resources and books you can use during stressful periods.    Try stress management techniques such as  meditation.    Consider online or in-person support groups.    Don t fight your feelings. Anxiety feeds itself. The more you worry about it, the worse it gets. Instead, try to identify what might have triggered your anxiety. Then try to put this threat in perspective.    Keep in mind that you can t control everything about a situation. Change what you can and let the rest take its course.    Exercise -- it s a great way to relieve tension and help your body feel relaxed.    Examine your life for stress, and try to find ways to reduce it.    Avoid caffeine and nicotine, which can make anxiety symptoms worse.    Fight the temptation to turn to alcohol or unprescribed drugs for relief. They only make things worse in the long run.   Date Last Reviewed: 1/1/2017 2000-2017 [employer]. [address] [place] [address], [place] [address]. All rights reserved. This information is not intended as a substitute for professional medical care. Always follow your healthcare professional's instructions.        Depression: Tips to Help Yourself  As your healthcare providers help treat your depression, you can also help yourself. Keep in mind that your illness affects you emotionally, physically, mentally, and socially. So full recovery will take time. Take care of your body and your soul, and be patient with yourself as you get better.    Self-care    Educate yourself. Read about treatment and medicine options. If you have the energy, attend local conferences or support groups. Keep a list of useful websites and helpful books and use them as needed. This illness is not your fault. Don t blame yourself for your depression.    Manage early symptoms. If you notice symptoms returning, experience triggers, or identify other factors that may lead to a depressive episode, get help as soon as possible. Ask trusted friends and family to monitor your behavior and let you know if they see anything of concern.    Work with your  provider. Find a provider you can trust. Communicate honestly with that person and share information on your treatment for depression and your reaction to medicines.    Be prepared for a crisis. Know what to do if you experience a crisis. Keep the phone number of a crisis hotline and know the location of your community's urgent care centers and the closest emergency department.    Hold off on big decisions. Depression can cloud your judgment. So wait until you feel better before making major life decisions, such as changing jobs, moving, or getting  or .    Be patient. Recovering from depression is a process. Don t be discouraged if it takes some time to feel better.    Keep it simple. Depression saps your energy and concentration. So you won t be able to do all the things you used to do. Set small goals and do what you can.    Be with others. Don t isolate yourself--you ll only feel worse. Try to be with other people. And take part in fun activities when you can. Go to a movie, ballgame, [employer] service, or social event. Talk openly with people you can trust. And accept help when it s offered.  Take care of your body  People with depression often lose the desire to take care of themselves. That only makes their problems worse. During treatment and afterward, make a point to:    Exercise. It s a great way to take care of your body. And studies have shown that exercise helps fight depression.    Avoid drugs and alcohol. These may ease the pain in the short term. But they ll only make your problems worse in the long run.    Get relief from stress. Ask your healthcare provider for relaxation exercises and techniques to help relieve stress.    Eat right. A balanced and healthy diet helps keep your body healthy.  Date Last Reviewed: 1/1/2017 2000-2017 [employer]. [address] [place] [address], [place] [address]. All rights reserved. This information is not intended as a substitute for  professional medical care. Always follow your healthcare professional's instructions.            Abilio Duenas MD  [facility]

## 2017-07-27 NOTE — NURSING NOTE
"Chief Complaint   Patient presents with     Anxiety     Panel Management       Initial /60  Pulse 94  Temp 98.2  F (36.8  C) (Temporal)  Resp 16  Ht 5' 5\" (1.651 m)  Wt 125 lb 9.6 oz (57 kg)  LMP 03/07/2009  Breastfeeding? No  BMI 20.9 kg/m2 Estimated body mass index is 20.9 kg/(m^2) as calculated from the following:    Height as of this encounter: 5' 5\" (1.651 m).    Weight as of this encounter: 125 lb 9.6 oz (57 kg).  Medication Reconciliation: complete     Celena Morel MA    "

## 2017-07-28 ASSESSMENT — ANXIETY QUESTIONNAIRES
5. BEING SO RESTLESS THAT IT IS HARD TO SIT STILL: NEARLY EVERY DAY
IF YOU CHECKED OFF ANY PROBLEMS ON THIS QUESTIONNAIRE, HOW DIFFICULT HAVE THESE PROBLEMS MADE IT FOR YOU TO DO YOUR WORK, TAKE CARE OF THINGS AT HOME, OR GET ALONG WITH OTHER PEOPLE: EXTREMELY DIFFICULT
6. BECOMING EASILY ANNOYED OR IRRITABLE: NEARLY EVERY DAY
3. WORRYING TOO MUCH ABOUT DIFFERENT THINGS: MORE THAN HALF THE DAYS
7. FEELING AFRAID AS IF SOMETHING AWFUL MIGHT HAPPEN: NEARLY EVERY DAY
1. FEELING NERVOUS, ANXIOUS, OR ON EDGE: MORE THAN HALF THE DAYS
2. NOT BEING ABLE TO STOP OR CONTROL WORRYING: MORE THAN HALF THE DAYS
GAD7 TOTAL SCORE: 18

## 2017-07-28 ASSESSMENT — PATIENT HEALTH QUESTIONNAIRE - PHQ9: 5. POOR APPETITE OR OVEREATING: NEARLY EVERY DAY

## 2017-07-29 ASSESSMENT — ANXIETY QUESTIONNAIRES: GAD7 TOTAL SCORE: 18

## 2017-07-29 ASSESSMENT — PATIENT HEALTH QUESTIONNAIRE - PHQ9: SUM OF ALL RESPONSES TO PHQ QUESTIONS 1-9: 18

## 2017-09-06 ENCOUNTER — TELEPHONE (OUTPATIENT)
Dept: FAMILY MEDICINE | Facility: CLINIC | Age: 42
End: 2017-09-06

## 2017-11-24 NOTE — PATIENT INSTRUCTIONS
Labs today.  We will fax biometrics form when lab work results are completed.  Schedule mammogram.    [facility]    Preventive Health Recommendations  Female Ages 40 to 49    Yearly exam:     See your health care provider every year in order to  1. Review health changes.   2. Discuss preventive care.    3. Review your medicines if your doctor prescribed any.      Get a Pap test every three years (unless you have an abnormal result and your provider advises testing more often).      If you get Pap tests with HPV test, you only need to test every 5 years, unless you have an abnormal result. You do not need a Pap test if your uterus was removed (hysterectomy) and you have not had cancer.      You should be tested each year for STDs (sexually transmitted diseases), if you're at risk.       Ask your doctor if you should have a mammogram.      Have a colonoscopy (test for colon cancer) if someone in your family has had colon cancer or polyps before age 50.       Have a cholesterol test every 5 years.       Have a diabetes test (fasting glucose) after age 45. If you are at risk for diabetes, you should have this test every 3 years.    Shots: Get a flu shot each year. Get a tetanus shot every 10 years.     Nutrition:     Eat at least 5 servings of fruits and vegetables each day.    Eat whole-grain bread, whole-wheat pasta and brown rice instead of white grains and rice.    Talk to your provider about Calcium and Vitamin D.     Lifestyle    Exercise at least 150 minutes a week (an average of 30 minutes a day, 5 days a week). This will help you control your weight and prevent disease.    Limit alcohol to one drink per day.    No smoking.     Wear sunscreen to prevent skin cancer.    See your dentist every six months for an exam and cleaning.

## 2017-11-24 NOTE — PROGRESS NOTES
SUBJECTIVE:   CC: Carolina Celeste is an 42 year old woman who presents for preventive health visit.     Physical   Annual:     Getting at least 3 servings of Calcium per day::  Yes    Bi-annual eye exam::  NO    Dental care twice a year::  Yes    Sleep apnea or symptoms of sleep apnea::  None    Diet::  Regular (no restrictions)    Frequency of exercise::  2-3 days/week    Duration of exercise::  45-60 minutes    Taking medications regularly::  Yes    Medication side effects::  Not applicable    Additional concerns today::  YES    Answers for HPI/ROS submitted by the patient on 11/29/2017   PHQ-2 Score: 2  If you checked off any problems, how difficult have these problems made it for you to do your work, take care of things at home, or get along with other people?: Not difficult at all  PHQ9 TOTAL SCORE: 6  SERGE 7 TOTAL SCORE: 6        Concern - Swollen Calf  Onset: Off and on for 2 years    Description:   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 Was seen previously for it and there were concerns of blood clot, patient unable to remember what happened that day. Was seen on 5/23 for right leg swelling    Intensity: severe    Progression of Symptoms:  intermittent    Accompanying Signs & Symptoms:  Pain, dark bruise    Previous history of similar problem:   None    Precipitating factors:   Worsened by: sitting for prolonged periods    Alleviating factors:  Improved by: None    Therapies Tried and outcome: Elevation, rest - takes days for swelling to go down      Today's PHQ-2 Score:   PHQ-2 ( 1999 [employer]) 11/29/2017   Q1: Little interest or pleasure in doing things 1   Q2: Feeling down, depressed or hopeless 1   PHQ-2 Score 2   Q1: Little interest or pleasure in doing things Several days   Q2: Feeling down, depressed or hopeless Several days   PHQ-2 Score 2     ACT Total Scores 9/8/2015 9/9/2016 11/29/2017   ACT TOTAL SCORE - - -   ASTHMA ER VISITS - - -   ASTHMA  HOSPITALIZATIONS - - -   ACT TOTAL SCORE (Goal Greater than or Equal to 20) 19 22 25   In the past 12 months, how many times did you visit the emergency room for your asthma without being admitted to the hospital? 0 0 -   In the past 12 months, how many times were you hospitalized overnight because of your asthma? 0 0 0         Abuse: Current or Past(Physical, Sexual or Emotional)- No  Do you feel safe in your environment - Yes    Social History   Substance Use Topics     Smoking status: Former Smoker     Packs/day: 0.10     Years: 4.00     Quit date: 2011     Smokeless tobacco: Never Used      Comment: Quit      Alcohol use 0.0 oz/week     0 Standard drinks or equivalent per week      Comment: very little     The patient does not drink >3 drinks per day nor >7 drinks per week.    Reviewed orders with patient.  Reviewed health maintenance and updated orders accordingly - Yes  Labs reviewed in EPIC  BP Readings from Last 3 Encounters:   17 100/60   17 124/60   17 112/76    Wt Readings from Last 3 Encounters:   17 127 lb 4.8 oz (57.7 kg)   17 125 lb 9.6 oz (57 kg)   17 127 lb 9.6 oz (57.9 kg)                      Patient under age 50, mutual decision reflected in health maintenance.        Pertinent mammograms are reviewed under the imaging tab.  History of abnormal Pap smear: NO - age 30-65 PAP every 5 years with negative HPV co-testing recommended    Reviewed and updated as needed this visit by clinical staffTobacco  Allergies  Med Hx  Surg Hx  Fam Hx  Soc Hx        Reviewed and updated as needed this visit by Provider        Past Medical History:   Diagnosis Date     Allergic rhinitis, cause unspecified 2006     Dysmenorrhea      Mild intermittent asthma 2006      Past Surgical History:   Procedure Laterality Date     LAPAROSCOPY,VAG HYSTERECTOMY  2009    supracervical hyst.     Obstetric History       T0      L2     SAB0   TAB0   Ectopic0    "Multiple0   Live Births2       # Outcome Date GA Lbr Yandel/2nd Weight Sex Delivery Anes PTL Lv   2  10/31/98 34w0d  5 lb 15 oz (2.693 kg) F    DALIA      Name: Nick   1  97 34w0d  5 lb 15 oz (2.693 kg) M    DALIA      Name: Reji          Review of Systems  C: NEGATIVE for fever, chills, change in weight  I: NEGATIVE for worrisome rashes, moles or lesions  E: NEGATIVE for vision changes or irritation  ENT: NEGATIVE for ear, mouth and throat problems  R: NEGATIVE for significant cough or SOB  B: NEGATIVE for masses, tenderness or discharge  CV: NEGATIVE for chest pain, palpitations or peripheral edema  GI: NEGATIVE for nausea, abdominal pain, heartburn, or change in bowel habits  : NEGATIVE for unusual urinary or vaginal symptoms. Periods are regular.  M: NEGATIVE for significant arthralgias or myalgia  N: NEGATIVE for weakness, dizziness or paresthesias  P: NEGATIVE for changes in mood or affect     OBJECTIVE:   /60 (BP Location: Left arm, Patient Position: Sitting, Cuff Size: Adult Regular)  Pulse 64  Temp 98.4  F (36.9  C) (Temporal)  Resp 16  Ht 5' 4.92\" (1.649 m)  Wt 127 lb 4.8 oz (57.7 kg)  LMP 2009  BMI 21.24 kg/m2  Physical Exam  GENERAL: healthy, alert and no distress  EYES: Eyes grossly normal to inspection, PERRL and conjunctivae and sclerae normal  HENT: ear canals and TM's normal, nose and mouth without ulcers or lesions  NECK: no adenopathy, no asymmetry, masses, or scars and thyroid normal to palpation  RESP: lungs clear to auscultation - no rales, rhonchi or wheezes  BREAST: normal without masses, tenderness or nipple discharge and no palpable axillary masses or adenopathy  CV: regular rate and rhythm, normal S1 S2, no S3 or S4, no murmur, click or rub, no peripheral edema and peripheral pulses strong  ABDOMEN: soft, nontender, no hepatosplenomegaly, no masses and bowel sounds normal   (female): normal female external genitalia, normal urethral meatus, " vaginal mucosa pink, moist, well rugated, and normal cervix/adnexa/uterus without masses or discharge  MS: no gross musculoskeletal defects noted, no edema  SKIN: no suspicious lesions or rashes  NEURO: Normal strength and tone, mentation intact and speech normal  PSYCH: mentation appears normal, affect normal/bright    ASSESSMENT/PLAN:   1. Mild intermittent asthma without complication  ACT Total Scores 9/8/2015 9/9/2016 11/29/2017   ACT TOTAL SCORE - - -   ASTHMA ER VISITS - - -   ASTHMA HOSPITALIZATIONS - - -   ACT TOTAL SCORE (Goal Greater than or Equal to 20) 19 22 25   In the past 12 months, how many times did you visit the emergency room for your asthma without being admitted to the hospital? 0 0 -   In the past 12 months, how many times were you hospitalized overnight because of your asthma? 0 0 0     Stable. Continue albuterol as needed. Currently taking Proair which is not as effective but ventolin is not covered by insurance.     2. Generalized anxiety disorder  Stopped Wellbutrin. Feels symptoms are stable off of this.     SERGE-7 SCORE 9/18/2015 7/28/2017 11/29/2017   Total Score - - 6 (mild anxiety)   Total Score 9 18 6       PHQ-9 SCORE 9/18/2015 7/28/2017 11/29/2017   Total Score MyChart - - 6 (Mild depression)   Total Score 2 18 6       3. Leg edema, right  Right calf swelling to the point that her jeans are not fitting around her lower leg and has to remove them. 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 Had ultrasound in the past which ruled out DVT. Recommend consult with vascular surgery for evaluation and treatment.     4. Encounter for screening mammogram for breast cancer  - *MA Screening Digital Bilateral; Future    5. Encounter for screening for cardiovascular disorders  - Lipid panel reflex to direct LDL Fasting    6. Screening for diabetes mellitus  - Basic metabolic panel    7. Screening for malignant neoplasm of cervix  8. History of hysterectomy leaving cervix intact  - Pap imaged thin layer screen  "with HPV - recommended age 30 - 65 years (select HPV order below)  - HPV High Risk Types DNA Cervical    9. Encounter for routine adult health examination without abnormal findings  See notes    COUNSELING:  Reviewed preventive health counseling, as reflected in patient instructions       Regular exercise       Healthy diet/nutrition         reports that she quit smoking about 5 years ago. She has a 0.40 pack-year smoking history. She has never used smokeless tobacco.    Estimated body mass index is 21.24 kg/(m^2) as calculated from the following:    Height as of this encounter: 5' 4.92\" (1.649 m).    Weight as of this encounter: 127 lb 4.8 oz (57.7 kg).         Counseling Resources:  ATP IV Guidelines  Pooled Cohorts Equation Calculator  Breast Cancer Risk Calculator  FRAX Risk Assessment  ICSI Preventive Guidelines  Dietary Guidelines for Americans, 2010  USDA's MyPlate  ASA Prophylaxis  Lung CA Screening    JOEL Ch [facility]  "

## 2017-11-29 ENCOUNTER — OFFICE VISIT (OUTPATIENT)
Dept: FAMILY MEDICINE | Facility: OTHER | Age: 42
End: 2017-11-29
Payer: COMMERCIAL

## 2017-11-29 VITALS
SYSTOLIC BLOOD PRESSURE: 100 MMHG | BODY MASS INDEX: 21.21 KG/M2 | TEMPERATURE: 98.4 F | HEART RATE: 64 BPM | DIASTOLIC BLOOD PRESSURE: 60 MMHG | WEIGHT: 127.3 LBS | RESPIRATION RATE: 16 BRPM | HEIGHT: 65 IN

## 2017-11-29 DIAGNOSIS — Z13.6 ENCOUNTER FOR SCREENING FOR CARDIOVASCULAR DISORDERS: ICD-10-CM

## 2017-11-29 DIAGNOSIS — Z90.711 HISTORY OF HYSTERECTOMY LEAVING CERVIX INTACT: ICD-10-CM

## 2017-11-29 DIAGNOSIS — Z13.1 SCREENING FOR DIABETES MELLITUS: ICD-10-CM

## 2017-11-29 DIAGNOSIS — J45.20 MILD INTERMITTENT ASTHMA WITHOUT COMPLICATION: Primary | ICD-10-CM

## 2017-11-29 DIAGNOSIS — F41.1 GENERALIZED ANXIETY DISORDER: ICD-10-CM

## 2017-11-29 DIAGNOSIS — R60.0 LEG EDEMA, RIGHT: ICD-10-CM

## 2017-11-29 DIAGNOSIS — Z12.31 ENCOUNTER FOR SCREENING MAMMOGRAM FOR BREAST CANCER: ICD-10-CM

## 2017-11-29 DIAGNOSIS — Z00.00 ENCOUNTER FOR ROUTINE ADULT HEALTH EXAMINATION WITHOUT ABNORMAL FINDINGS: ICD-10-CM

## 2017-11-29 DIAGNOSIS — Z12.4 SCREENING FOR MALIGNANT NEOPLASM OF CERVIX: ICD-10-CM

## 2017-11-29 LAB
ANION GAP SERPL CALCULATED.3IONS-SCNC: 5 MMOL/L (ref 3–14)
BUN SERPL-MCNC: 11 MG/DL (ref 7–30)
CALCIUM SERPL-MCNC: 8.3 MG/DL (ref 8.5–10.1)
CHLORIDE SERPL-SCNC: 109 MMOL/L (ref 94–109)
CHOLEST SERPL-MCNC: 187 MG/DL
CO2 SERPL-SCNC: 28 MMOL/L (ref 20–32)
CREAT SERPL-MCNC: 0.65 MG/DL (ref 0.52–1.04)
GFR SERPL CREATININE-BSD FRML MDRD: >90 ML/MIN/1.7M2
GLUCOSE SERPL-MCNC: 101 MG/DL (ref 70–99)
HDLC SERPL-MCNC: 76 MG/DL
LDLC SERPL CALC-MCNC: 101 MG/DL
NONHDLC SERPL-MCNC: 111 MG/DL
POTASSIUM SERPL-SCNC: 4.1 MMOL/L (ref 3.4–5.3)
SODIUM SERPL-SCNC: 142 MMOL/L (ref 133–144)
TRIGL SERPL-MCNC: 49 MG/DL

## 2017-11-29 PROCEDURE — 99396 PREV VISIT EST AGE 40-64: CPT | Performed by: STUDENT IN AN ORGANIZED HEALTH CARE EDUCATION/TRAINING PROGRAM

## 2017-11-29 PROCEDURE — 36415 COLL VENOUS BLD VENIPUNCTURE: CPT | Performed by: STUDENT IN AN ORGANIZED HEALTH CARE EDUCATION/TRAINING PROGRAM

## 2017-11-29 PROCEDURE — 87624 HPV HI-RISK TYP POOLED RSLT: CPT | Performed by: STUDENT IN AN ORGANIZED HEALTH CARE EDUCATION/TRAINING PROGRAM

## 2017-11-29 PROCEDURE — 80048 BASIC METABOLIC PNL TOTAL CA: CPT | Performed by: STUDENT IN AN ORGANIZED HEALTH CARE EDUCATION/TRAINING PROGRAM

## 2017-11-29 PROCEDURE — 80061 LIPID PANEL: CPT | Performed by: STUDENT IN AN ORGANIZED HEALTH CARE EDUCATION/TRAINING PROGRAM

## 2017-11-29 PROCEDURE — G0145 SCR C/V CYTO,THINLAYER,RESCR: HCPCS | Performed by: STUDENT IN AN ORGANIZED HEALTH CARE EDUCATION/TRAINING PROGRAM

## 2017-11-29 ASSESSMENT — PATIENT HEALTH QUESTIONNAIRE - PHQ9
SUM OF ALL RESPONSES TO PHQ QUESTIONS 1-9: 6
SUM OF ALL RESPONSES TO PHQ QUESTIONS 1-9: 6
10. IF YOU CHECKED OFF ANY PROBLEMS, HOW DIFFICULT HAVE THESE PROBLEMS MADE IT FOR YOU TO DO YOUR WORK, TAKE CARE OF THINGS AT HOME, OR GET ALONG WITH OTHER PEOPLE: NOT DIFFICULT AT ALL

## 2017-11-29 ASSESSMENT — PAIN SCALES - GENERAL: PAINLEVEL: NO PAIN (0)

## 2017-11-29 ASSESSMENT — ANXIETY QUESTIONNAIRES
3. WORRYING TOO MUCH ABOUT DIFFERENT THINGS: SEVERAL DAYS
4. TROUBLE RELAXING: SEVERAL DAYS
5. BEING SO RESTLESS THAT IT IS HARD TO SIT STILL: SEVERAL DAYS
GAD7 TOTAL SCORE: 6
7. FEELING AFRAID AS IF SOMETHING AWFUL MIGHT HAPPEN: SEVERAL DAYS
7. FEELING AFRAID AS IF SOMETHING AWFUL MIGHT HAPPEN: SEVERAL DAYS
GAD7 TOTAL SCORE: 6
1. FEELING NERVOUS, ANXIOUS, OR ON EDGE: NOT AT ALL
6. BECOMING EASILY ANNOYED OR IRRITABLE: SEVERAL DAYS
2. NOT BEING ABLE TO STOP OR CONTROL WORRYING: SEVERAL DAYS
GAD7 TOTAL SCORE: 6

## 2017-11-29 NOTE — LETTER
December 9, 2017    Carolina Celeste  [address] [address] [place] [id number]    Dear Carolina,  We are happy to inform you that your PAP smear result from 11/29/17 is normal.  We are now able to do a follow up test on PAP smears. The DNA test is for HPV (Human Papilloma Virus). Cervical cancer is closely linked with certain types of HPV. Your result showed no evidence of high risk HPV.  Therefore we recommend you return in 5 years for your next pap smear and HPV test.  You will still need to return to the clinic every year for an annual exam and other preventive tests.  Please contact the clinic at [phone] with any questions.  Sincerely,    JOEL Ch CNP/rlm

## 2017-11-29 NOTE — MR AVS SNAPSHOT
After Visit Summary   11/29/2017    Carolina Celeste    MRN: [id number]           Patient Information     Date Of Birth          1975        Visit Information        Provider Department      11/29/2017 8:00 AM Susan Amaro APRN [facility]3]        Today's Diagnoses     Encounter for screening mammogram for breast cancer    -  1    Need for prophylactic vaccination and inoculation against influenza        Need for prophylactic vaccination with tetanus-diphtheria (TD)        Encounter for screening for cardiovascular disorders        Screening for diabetes mellitus        Mild intermittent asthma without complication          Care Instructions    Labs today.  We will fax biometrics form when lab work results are completed.  Schedule mammogram.    [facility] [place]    Preventive Health Recommendations  Female Ages 40 to 49    Yearly exam:     See your health care provider every year in order to  1. Review health changes.   2. Discuss preventive care.    3. Review your medicines if your doctor prescribed any.      Get a Pap test every three years (unless you have an abnormal result and your provider advises testing more often).      If you get Pap tests with HPV test, you only need to test every 5 years, unless you have an abnormal result. You do not need a Pap test if your uterus was removed (hysterectomy) and you have not had cancer.      You should be tested each year for STDs (sexually transmitted diseases), if you're at risk.       Ask your doctor if you should have a mammogram.      Have a colonoscopy (test for colon cancer) if someone in your family has had colon cancer or polyps before age 50.       Have a cholesterol test every 5 years.       Have a diabetes test (fasting glucose) after age 45. If you are at risk for diabetes, you should have this test every 3 years.    Shots: Get a flu shot each year. Get a tetanus shot every 10 years.     Nutrition:  "    Eat at least 5 servings of fruits and vegetables each day.    Eat whole-grain bread, whole-wheat pasta and brown rice instead of white grains and rice.    Talk to your provider about Calcium and Vitamin D.     Lifestyle    Exercise at least 150 minutes a week (an average of 30 minutes a day, 5 days a week). This will help you control your weight and prevent disease.    Limit alcohol to one drink per day.    No smoking.     Wear sunscreen to prevent skin cancer.    See your dentist every six months for an exam and cleaning.          Follow-ups after your visit        Future tests that were ordered for you today     Open Future Orders        Priority Expected Expires Ordered    *MA Screening Digital Bilateral Routine  11/29/2018 11/29/2017            Who to contact     If you have questions or need follow up information about today's clinic visit or your schedule please contact [facility] directly at [phone].  Normal or non-critical lab and imaging results will be communicated to you by MyChart, letter or phone within 4 business days after the clinic has received the results. If you do not hear from us within 7 days, please contact the clinic through [employer] or phone. If you have a critical or abnormal lab result, we will notify you by phone as soon as possible.  Submit refill requests through [employer] or call your pharmacy and they will forward the refill request to us. Please allow 3 business days for your refill to be completed.          Additional Information About Your Visit        [employer] Information     [employer] lets you send messages to your doctor, view your test results, renew your prescriptions, schedule appointments and more. To sign up, go to [email or web address] . Click on \"Log in\" on the left side of the screen, which will take you to the Welcome page. Then click on \"Sign up Now\" on the right side of the page.     You will be asked to enter the access code listed below, as " "well as some personal information. Please follow the directions to create your username and password.     Your access code is: [email or web address]  Expires: 2018  8:40 AM     Your access code will  in 90 days. If you need help or a new code, please call your [facility] clinic or [phone].        Care EveryWhere ID     This is your Care EveryWhere ID. This could be used by other organizations to access your [facility] medical records  [id number]        Your Vitals Were     Pulse Temperature Respirations Height Last Period BMI (Body Mass Index)    64 98.4  F (36.9  C) (Temporal) 16 5' 4.92\" (1.649 m) 2009 21.24 kg/m2       Blood Pressure from Last 3 Encounters:   17 100/60   17 124/60   17 112/76    Weight from Last 3 Encounters:   17 127 lb 4.8 oz (57.7 kg)   17 125 lb 9.6 oz (57 kg)   17 127 lb 9.6 oz (57.9 kg)              We Performed the Following     Asthma Action Plan (AAP)     Basic metabolic panel     Lipid panel reflex to direct LDL Fasting        Primary Care Provider Office Phone # Fax #    Susan JOEL Levy [facility] [phone] [address] [address] [place] [id number]        Equal Access to Services     [facility] Center[facility]UBERT : Hadii aad ku hadasho Soomaali, waaxda luqadaha, qaybta kaalmada adeegyada, vickey wright . So [facility] [phone].    ATENCIÓN: Si habla español, tiene a ji disposición servicios gratuitos de asistencia lingüística. Llame al [phone].    We comply with applicable federal civil rights laws and [place] laws. We do not discriminate on the basis of race, color, national origin, age, disability, sex, sexual orientation, or gender identity.            Thank you!     Thank you for choosing [facility]  for your care. Our goal is always to provide you with excellent care. Hearing back from our patients is one way we can continue to improve our services. Please take a few minutes to " complete the written survey that you may receive in the mail after your visit with us. Thank you!             Your Updated Medication List - Protect others around you: Learn how to safely use, store and throw away your medicines at www.disposemymeds.org.          This list is accurate as of: 11/29/17  8:40 AM.  Always use your most recent med list.                   Brand Name Dispense Instructions for use Diagnosis    hydrOXYzine 25 MG tablet    ATARAX    30 tablet    Take 1-2 tablets (25-50 mg) by mouth every 6 hours as needed for anxiety    Grief reaction, Generalized anxiety disorder       VENTOLIN  (90 BASE) MCG/ACT Inhaler   Generic drug:  albuterol     1 Inhaler    Inhale 2 puffs into the lungs every 4 hours as needed for shortness of breath / dyspnea or wheezing . Appointment needed for additional refills.    Intermittent asthma, uncomplicated

## 2017-11-29 NOTE — NURSING NOTE
"Chief Complaint   Patient presents with     Physical     Panel Management     tdap, flu shot, phq, aap, act       Initial /60 (BP Location: Left arm, Patient Position: Sitting, Cuff Size: Adult Regular)  Pulse 64  Temp 98.4  F (36.9  C) (Temporal)  Resp 16  Ht 5' 4.92\" (1.649 m)  Wt 127 lb 4.8 oz (57.7 kg)  LMP 03/07/2009  BMI 21.24 kg/m2 Estimated body mass index is 21.24 kg/(m^2) as calculated from the following:    Height as of this encounter: 5' 4.92\" (1.649 m).    Weight as of this encounter: 127 lb 4.8 oz (57.7 kg).  Medication Reconciliation: complete   Michelle Brown CMA      "

## 2017-11-30 ENCOUNTER — TELEPHONE (OUTPATIENT)
Dept: FAMILY MEDICINE | Facility: OTHER | Age: 42
End: 2017-11-30

## 2017-11-30 ASSESSMENT — PATIENT HEALTH QUESTIONNAIRE - PHQ9: SUM OF ALL RESPONSES TO PHQ QUESTIONS 1-9: 6

## 2017-11-30 ASSESSMENT — ANXIETY QUESTIONNAIRES: GAD7 TOTAL SCORE: 6

## 2017-11-30 NOTE — TELEPHONE ENCOUNTER
I wasn't sure where the form is?  Lucia Prado RT (R)    Notes Recorded by Sondra, JOEL Reynolds CNP on 11/29/2017 at 10:39 PM  Please call patient and let know her blood work is essentially normal. Also please fill out biometric form with results and place on my desk if it still needs signature. Otherwise if I already signed it, please fax and mail original to patient.  Thanks,  Susan Amaro, CNP

## 2017-12-01 LAB
COPATH REPORT: NORMAL
PAP: NORMAL

## 2017-12-04 LAB
FINAL DIAGNOSIS: NORMAL
HPV HR 12 DNA CVX QL NAA+PROBE: NEGATIVE
HPV16 DNA SPEC QL NAA+PROBE: NEGATIVE
HPV18 DNA SPEC QL NAA+PROBE: NEGATIVE
SPECIMEN DESCRIPTION: NORMAL

## 2017-12-05 ENCOUNTER — TELEPHONE (OUTPATIENT)
Dept: FAMILY MEDICINE | Facility: OTHER | Age: 42
End: 2017-12-05

## 2017-12-05 ENCOUNTER — HOSPITAL ENCOUNTER (OUTPATIENT)
Dept: MAMMOGRAPHY | Facility: CLINIC | Age: 42
Discharge: HOME | End: 2017-12-05
Attending: STUDENT IN AN ORGANIZED HEALTH CARE EDUCATION/TRAINING PROGRAM | Admitting: STUDENT IN AN ORGANIZED HEALTH CARE EDUCATION/TRAINING PROGRAM
Payer: COMMERCIAL

## 2017-12-05 DIAGNOSIS — Z12.31 VISIT FOR SCREENING MAMMOGRAM: ICD-10-CM

## 2017-12-05 PROCEDURE — G0202 SCR MAMMO BI INCL CAD: HCPCS

## 2017-12-05 NOTE — TELEPHONE ENCOUNTER
Please call patient and let her know her blood work and Pap all look good.  Thanks,  Susan Amaro, CNP

## 2017-12-05 NOTE — TELEPHONE ENCOUNTER
Reason for Call:  Request for results:    Name of test or procedure: pap    Date of test of procedure: 11/29/17    [facility] of the test or procedure[facility] to leave the result message on voice mail or with a family member? YES    Phone number Patient can be reached at:  Work number on file:  [phone] (work)    Additional comments: please call with results    Call taken on 12/5/2017 at 10:54 AM by Christi Mas

## 2018-01-04 ENCOUNTER — TELEPHONE (OUTPATIENT)
Dept: FAMILY MEDICINE | Facility: OTHER | Age: 43
End: 2018-01-04

## 2018-01-04 DIAGNOSIS — J45.20 INTERMITTENT ASTHMA, UNCOMPLICATED: ICD-10-CM

## 2018-01-04 ASSESSMENT — ASTHMA QUESTIONNAIRES: ACT_TOTALSCORE: 25

## 2018-01-04 NOTE — TELEPHONE ENCOUNTER
Spoke to patient who stated medications were requested to be filled at annual exam on 11/29/17. Patient stated she is not interested in making another appointment..  Informed will see what provider suggests.  Abi Brown CMA ([facility])

## 2018-01-04 NOTE — TELEPHONE ENCOUNTER
Reason for Call:  Medication or medication refill:    Do you use a [facility]?  Name of the pharmacy and phone number for the current request:  [facility] - [phone]    Name of the medication requested: albuterol    Other request: patient states her rx was never called in.     Can we leave a detailed message on this number? YES    Phone number patient can be reached at: Home number on file [phone] (home)    Best Time: any    Call taken on 1/4/2018 at 12:04 PM by Toña Median

## 2018-01-05 RX ORDER — ALBUTEROL SULFATE 90 UG/1
2 AEROSOL, METERED RESPIRATORY (INHALATION) EVERY 4 HOURS PRN
Qty: 1 INHALER | Refills: 6 | Status: SHIPPED | OUTPATIENT
Start: 2018-01-05 | End: 2018-11-02

## 2018-01-05 NOTE — TELEPHONE ENCOUNTER
Please call patient and let her know the refill for albuterol was sent to the [facility] pharmacy.  KHLOE DaleC

## 2018-10-29 NOTE — PROGRESS NOTES
SUBJECTIVE:   CC: Carolina Celeste is an 43 year old woman who presents for preventive health visit.     Physical   Annual:     Getting at least 3 servings of Calcium per day:  NO    Bi-annual eye exam:  Yes    Dental care twice a year:  Yes    Sleep apnea or symptoms of sleep apnea:  None    Diet:  Regular (no restrictions)    Frequency of exercise:  2-3 days/week    Duration of exercise:  15-30 minutes    Taking medications regularly:  Yes    Medication side effects:  Not applicable    Additional concerns today:  No        Asthma Follow-Up    Was ACT completed today?    Yes    ACT Total Scores 11/2/2018   ACT TOTAL SCORE -   ASTHMA ER VISITS -   ASTHMA HOSPITALIZATIONS -   ACT TOTAL SCORE (Goal Greater than or Equal to 20) 17   In the past 12 months, how many times did you visit the emergency room for your asthma without being admitted to the hospital? 0   In the past 12 months, how many times were you hospitalized overnight because of your asthma? 0       Recent asthma triggers that patient is dealing with: pollens        Today's PHQ-2 Score:   PHQ-2 ( 1999 [employer]) 11/2/2018   Q1: Little interest or pleasure in doing things 1   Q2: Feeling down, depressed or hopeless 1   PHQ-2 Score 2   Q1: Little interest or pleasure in doing things Several days   Q2: Feeling down, depressed or hopeless Several days   PHQ-2 Score 2     Answers for HPI/ROS submitted by the patient on 11/2/2018   PHQ-2 Score: 2      Abuse: Current or Past(Physical, Sexual or Emotional)- No  Do you feel safe in your environment - Yes    Social History   Substance Use Topics     Smoking status: Former Smoker     Packs/day: 0.10     Years: 4.00     Quit date: 12/31/2011     Smokeless tobacco: Never Used      Comment: Quit 2003     Alcohol use 0.0 oz/week     0 Standard drinks or equivalent per week      Comment: very little     Alcohol Use 11/2/2018   If you drink alcohol do you typically have greater than 3 drinks per day OR greater than 7 drinks  per week? No   No flowsheet data found.    Reviewed orders with patient.  Reviewed health maintenance and updated orders accordingly - Yes  Labs reviewed in EPIC  BP Readings from Last 3 Encounters:   11/02/18 92/64   11/29/17 100/60   07/27/17 124/60    Wt Readings from Last 3 Encounters:   11/02/18 126 lb 8 oz (57.4 kg)   11/29/17 127 lb 4.8 oz (57.7 kg)   07/27/17 125 lb 9.6 oz (57 kg)                  Patient Active Problem List   Diagnosis     Allergic rhinitis     Generalized anxiety disorder     Intermittent asthma     Immunization not carried out because of patient refusal     History of hysterectomy leaving cervix intact     History of tobacco use, presenting hazards to health     Past Surgical History:   Procedure Laterality Date     LAPAROSCOPY,VAG HYSTERECTOMY  4/2009    supracervical hyst.       Social History   Substance Use Topics     Smoking status: Former Smoker     Packs/day: 0.10     Years: 4.00     Quit date: 12/31/2011     Smokeless tobacco: Never Used      Comment: Quit 2003     Alcohol use 0.0 oz/week     0 Standard drinks or equivalent per week      Comment: very little     Family History   Problem Relation Age of Onset     Lipids Father      Hypertension Father      Psychotic Disorder Father      [place]     Neurologic Disorder Maternal Grandmother      Parkinsons     Depression Maternal Grandmother      Hypertension Maternal Grandmother      Alzheimer Disease Maternal Grandfather      Genitourinary Problems Paternal Grandfather      Cirrohosis of LIver     Breast Cancer Other      Cancer Maternal Uncle      Stomach/Esophagus     Breast Cancer Maternal Aunt      Cancer Maternal Uncle      stomach     Cancer - colorectal No family hx of          Current Outpatient Prescriptions   Medication Sig Dispense Refill     hydrOXYzine (ATARAX) 25 MG tablet Take 1-2 tablets (25-50 mg) by mouth every 6 hours as needed for anxiety (Patient not taking: Reported on 11/2/2018) 30 tablet 1     venlafaxine  (EFFEXOR-XR) 37.5 MG 24 hr capsule Take 1 capsule (37.5 mg) by mouth daily 30 capsule 1     VENTOLIN  (90 Base) MCG/ACT inhaler Inhale 2 puffs into the lungs every 4 hours as needed for shortness of breath / dyspnea or wheezing 1 Inhaler 6     Allergies   Allergen Reactions     Sunflower Seed [Sunflower Oil]      Throat itching, wheezing     Latex        Patient under age 50, mutual decision reflected in health maintenance. Maternal aunt x2 breast cancer      Pertinent mammograms are reviewed under the imaging tab.  History of abnormal Pap smear:   NO - age 30-65 PAP every 5 years with negative HPV co-testing recommended  Last 3 Pap Results:   PAP (no units)   Date Value   2017 NIL   2016 NIL   2014 NIL     PAP / HPV Latest Ref Rng & Units 2017   PAP - NIL NIL NIL   HPV 16 DNA NEG:Negative Negative Negative -   HPV 18 DNA NEG:Negative Negative Negative -   OTHER HR HPV NEG:Negative Negative Negative -     Reviewed and updated as needed this visit by clinical staff  Tobacco  Allergies  Meds  Med Hx  Surg Hx  Fam Hx  Soc Hx        Reviewed and updated as needed this visit by Provider        Past Medical History:   Diagnosis Date     Allergic rhinitis, cause unspecified 2006     Dysmenorrhea      Mild intermittent asthma 2006      Past Surgical History:   Procedure Laterality Date     LAPAROSCOPY,VAG HYSTERECTOMY  2009    supracervical hyst.     Obstetric History       T0      L2     SAB0   TAB0   Ectopic0   Multiple0   Live Births2       # Outcome Date GA Lbr Yandel/2nd Weight Sex Delivery Anes PTL Lv   2  10/31/98 34w0d  5 lb 15 oz (2.693 kg) F    DALIA      Name: Nick   1  97 34w0d  5 lb 15 oz (2.693 kg) M    DALIA      Name: Reji          Review of Systems  CONSTITUTIONAL: NEGATIVE for fever, chills, change in weight  INTEGUMENTARU/SKIN: NEGATIVE for worrisome rashes, moles or lesions  EYES: NEGATIVE for vision  "changes or irritation  ENT: NEGATIVE for ear, mouth and throat problems  RESP: NEGATIVE for significant cough or SOB  BREAST: NEGATIVE for masses, tenderness or discharge  CV: NEGATIVE for chest pain, palpitations or peripheral edema  GI: POSITIVE for constipation NEGATIVE for nausea, abdominal pain, heartburn  : NEGATIVE for unusual urinary or vaginal symptoms. Periods are regular.  MUSCULOSKELETAL: NEGATIVE for significant arthralgias or myalgia  NEURO: NEGATIVE for weakness, dizziness or paresthesias  ENDOCRINE: NEGATIVE for temperature intolerance, skin/hair changes  PSYCHIATRIC: POSITIVE for irritability      OBJECTIVE:   BP 92/64  Pulse 64  Temp 98.2  F (36.8  C) (Temporal)  Resp 16  Ht 5' 4.88\" (1.648 m)  Wt 126 lb 8 oz (57.4 kg)  LMP 03/07/2009  BMI 21.13 kg/m2  Physical Exam  GENERAL: healthy, alert and no distress  EYES: Eyes grossly normal to inspection, PERRL and conjunctivae and sclerae normal  HENT: ear canals and TM's normal, nose and mouth without ulcers or lesions  NECK: no adenopathy, no asymmetry, masses, or scars and thyroid normal to palpation  RESP: lungs clear to auscultation - no rales, rhonchi or wheezes  BREAST: normal without masses, tenderness or nipple discharge and no palpable axillary masses or adenopathy  CV: regular rate and rhythm, normal S1 S2, no S3 or S4, no murmur, click or rub, no peripheral edema and peripheral pulses strong  ABDOMEN: soft, nontender, no hepatosplenomegaly, no masses and bowel sounds normal   (female): normal female external genitalia, normal urethral meatus, vaginal tissues moist, well rugated, and adnexa without masses or discharge  MS: no gross musculoskeletal defects noted, no edema  SKIN: no suspicious lesions or rashes  NEURO: Normal strength and tone, mentation intact and speech normal  PSYCH: mentation appears normal, affect normal/bright    Diagnostic Test Results:  Results for orders placed or performed in visit on 11/02/18   Lipid panel " reflex to direct LDL Fasting   Result Value Ref Range    Cholesterol 194 <200 mg/dL    Triglycerides 58 <150 mg/dL    HDL Cholesterol 66 >49 mg/dL    LDL Cholesterol Calculated 116 (H) <100 mg/dL    Non HDL Cholesterol 128 <130 mg/dL   Basic metabolic panel   Result Value Ref Range    Sodium 141 133 - 144 mmol/L    Potassium 4.0 3.4 - 5.3 mmol/L    Chloride 106 94 - 109 mmol/L    Carbon Dioxide 28 20 - 32 mmol/L    Anion Gap 7 3 - 14 mmol/L    Glucose 88 70 - 99 mg/dL    Urea Nitrogen 13 7 - 30 mg/dL    Creatinine 0.77 0.52 - 1.04 mg/dL    GFR Estimate 81 >60 mL/min/1.7m2    GFR Estimate If Black >90 >60 mL/min/1.7m2    Calcium 8.9 8.5 - 10.1 mg/dL   TSH with free T4 reflex   Result Value Ref Range    TSH 1.41 0.40 - 4.00 mU/L         ASSESSMENT/PLAN:   1. Encounter for routine adult health examination without abnormal findings  See notes    2. Perimenopausal symptoms  Will start low dose Effexor for perimenopause symptoms. Follow up in 6 weeks.   - venlafaxine (EFFEXOR-XR) 37.5 MG 24 hr capsule; Take 1 capsule (37.5 mg) by mouth daily  Dispense: 30 capsule; Refill: 1  - TSH with free T4 reflex    3. Intermittent asthma, uncomplicated  Pro-Air is not helping symptoms as evidenced by ACT scores. Will request prior auth for Ventolin.   ACT Total Scores 9/9/2016 11/29/2017 11/2/2018   ACT TOTAL SCORE - - -   ASTHMA ER VISITS - - -   ASTHMA HOSPITALIZATIONS - - -   ACT TOTAL SCORE (Goal Greater than or Equal to 20) 22 25 17   In the past 12 months, how many times did you visit the emergency room for your asthma without being admitted to the hospital? 0 0 0   In the past 12 months, how many times were you hospitalized overnight because of your asthma? 0 0 0     - VENTOLIN  (90 Base) MCG/ACT inhaler; Inhale 2 puffs into the lungs every 4 hours as needed for shortness of breath / dyspnea or wheezing  Dispense: 1 Inhaler; Refill: 6    4. Screening for diabetes mellitus  - Basic metabolic panel    5. Lipid  "screening  - Lipid panel reflex to direct LDL Fasting    6. Immunization not carried out because of patient refusal  Declined flu vaccine    7. History of hysterectomy leaving cervix intact  Pap due in 4 years. Pelvic exam normal.    8. Encounter for screening mammogram for breast cancer  Discussion about frequency and patient elected every 2 years.    9. History of tobacco use, presenting hazards to health  Is still a nonsmoker.       COUNSELING:  Reviewed preventive health counseling, as reflected in patient instructions       Regular exercise       Healthy diet/nutrition       Immunizations    Declined: Influenza due to Concerns about side effects/safety               (Naa)menopause management    BP Readings from Last 1 Encounters:   11/02/18 92/64     Estimated body mass index is 21.13 kg/(m^2) as calculated from the following:    Height as of this encounter: 5' 4.88\" (1.648 m).    Weight as of this encounter: 126 lb 8 oz (57.4 kg).           reports that she quit smoking about 6 years ago. She has a 0.40 pack-year smoking history. She has never used smokeless tobacco.      Counseling Resources:  ATP IV Guidelines  Pooled Cohorts Equation Calculator  Breast Cancer Risk Calculator  FRAX Risk Assessment  ICSI Preventive Guidelines  Dietary Guidelines for Americans, 2010  USDA's MyPlate  ASA Prophylaxis  Lung CA Screening    JOEL Ch [facility]  "

## 2018-10-29 NOTE — PATIENT INSTRUCTIONS
Start venlafaxine 37.5 mg daily for perimenopause symptoms.    Blood work today including checking thyroid.    Susan Amaro NP-C        Preventive Health Recommendations  Female Ages 40 to 49    Yearly exam:     See your health care provider every year in order to  1. Review health changes.   2. Discuss preventive care.    3. Review your medicines if your doctor prescribed any.      Get a Pap test every three years (unless you have an abnormal result and your provider advises testing more often).      If you get Pap tests with HPV test, you only need to test every 5 years, unless you have an abnormal result. You do not need a Pap test if your uterus was removed (hysterectomy) and you have not had cancer.      You should be tested each year for STDs (sexually transmitted diseases), if you're at risk.     Ask your doctor if you should have a mammogram.      Have a colonoscopy (test for colon cancer) if someone in your family has had colon cancer or polyps before age 50.       Have a cholesterol test every 5 years.       Have a diabetes test (fasting glucose) after age 45. If you are at risk for diabetes, you should have this test every 3 years.    Shots: Get a flu shot each year. Get a tetanus shot every 10 years.     Nutrition:     Eat at least 5 servings of fruits and vegetables each day.    Eat whole-grain bread, whole-wheat pasta and brown rice instead of white grains and rice.    Get adequate Calcium and Vitamin D.      Lifestyle    Exercise at least 150 minutes a week (an average of 30 minutes a day, 5 days a week). This will help you control your weight and prevent disease.    Limit alcohol to one drink per day.    No smoking.     Wear sunscreen to prevent skin cancer.    See your dentist every six months for an exam and cleaning.

## 2018-11-02 ENCOUNTER — TELEPHONE (OUTPATIENT)
Dept: FAMILY MEDICINE | Facility: OTHER | Age: 43
End: 2018-11-02

## 2018-11-02 ENCOUNTER — OFFICE VISIT (OUTPATIENT)
Dept: FAMILY MEDICINE | Facility: OTHER | Age: 43
End: 2018-11-02
Payer: COMMERCIAL

## 2018-11-02 VITALS
HEIGHT: 65 IN | WEIGHT: 126.5 LBS | TEMPERATURE: 98.2 F | RESPIRATION RATE: 16 BRPM | HEART RATE: 64 BPM | BODY MASS INDEX: 21.08 KG/M2 | DIASTOLIC BLOOD PRESSURE: 64 MMHG | SYSTOLIC BLOOD PRESSURE: 92 MMHG

## 2018-11-02 DIAGNOSIS — Z87.891 HISTORY OF TOBACCO USE, PRESENTING HAZARDS TO HEALTH: ICD-10-CM

## 2018-11-02 DIAGNOSIS — N95.1 PERIMENOPAUSAL SYMPTOMS: ICD-10-CM

## 2018-11-02 DIAGNOSIS — J45.20 INTERMITTENT ASTHMA, UNCOMPLICATED: ICD-10-CM

## 2018-11-02 DIAGNOSIS — Z00.00 ENCOUNTER FOR ROUTINE ADULT HEALTH EXAMINATION WITHOUT ABNORMAL FINDINGS: Primary | ICD-10-CM

## 2018-11-02 DIAGNOSIS — Z12.31 ENCOUNTER FOR SCREENING MAMMOGRAM FOR BREAST CANCER: ICD-10-CM

## 2018-11-02 DIAGNOSIS — Z13.1 SCREENING FOR DIABETES MELLITUS: ICD-10-CM

## 2018-11-02 DIAGNOSIS — Z28.21 IMMUNIZATION NOT CARRIED OUT BECAUSE OF PATIENT REFUSAL: ICD-10-CM

## 2018-11-02 DIAGNOSIS — Z13.220 LIPID SCREENING: ICD-10-CM

## 2018-11-02 DIAGNOSIS — Z90.711 HISTORY OF HYSTERECTOMY LEAVING CERVIX INTACT: ICD-10-CM

## 2018-11-02 LAB
ANION GAP SERPL CALCULATED.3IONS-SCNC: 7 MMOL/L (ref 3–14)
BUN SERPL-MCNC: 13 MG/DL (ref 7–30)
CALCIUM SERPL-MCNC: 8.9 MG/DL (ref 8.5–10.1)
CHLORIDE SERPL-SCNC: 106 MMOL/L (ref 94–109)
CHOLEST SERPL-MCNC: 194 MG/DL
CO2 SERPL-SCNC: 28 MMOL/L (ref 20–32)
CREAT SERPL-MCNC: 0.77 MG/DL (ref 0.52–1.04)
GFR SERPL CREATININE-BSD FRML MDRD: 81 ML/MIN/1.7M2
GLUCOSE SERPL-MCNC: 88 MG/DL (ref 70–99)
HDLC SERPL-MCNC: 66 MG/DL
LDLC SERPL CALC-MCNC: 116 MG/DL
NONHDLC SERPL-MCNC: 128 MG/DL
POTASSIUM SERPL-SCNC: 4 MMOL/L (ref 3.4–5.3)
SODIUM SERPL-SCNC: 141 MMOL/L (ref 133–144)
TRIGL SERPL-MCNC: 58 MG/DL
TSH SERPL DL<=0.005 MIU/L-ACNC: 1.41 MU/L (ref 0.4–4)

## 2018-11-02 PROCEDURE — 84443 ASSAY THYROID STIM HORMONE: CPT | Performed by: STUDENT IN AN ORGANIZED HEALTH CARE EDUCATION/TRAINING PROGRAM

## 2018-11-02 PROCEDURE — 80061 LIPID PANEL: CPT | Performed by: STUDENT IN AN ORGANIZED HEALTH CARE EDUCATION/TRAINING PROGRAM

## 2018-11-02 PROCEDURE — 36415 COLL VENOUS BLD VENIPUNCTURE: CPT | Performed by: STUDENT IN AN ORGANIZED HEALTH CARE EDUCATION/TRAINING PROGRAM

## 2018-11-02 PROCEDURE — 99396 PREV VISIT EST AGE 40-64: CPT | Performed by: STUDENT IN AN ORGANIZED HEALTH CARE EDUCATION/TRAINING PROGRAM

## 2018-11-02 PROCEDURE — 80048 BASIC METABOLIC PNL TOTAL CA: CPT | Performed by: STUDENT IN AN ORGANIZED HEALTH CARE EDUCATION/TRAINING PROGRAM

## 2018-11-02 RX ORDER — VENLAFAXINE HYDROCHLORIDE 37.5 MG/1
37.5 CAPSULE, EXTENDED RELEASE ORAL DAILY
Qty: 30 CAPSULE | Refills: 1 | Status: SHIPPED | OUTPATIENT
Start: 2018-11-02 | End: 2019-02-04

## 2018-11-02 ASSESSMENT — ENCOUNTER SYMPTOMS
COUGH: 0
HEMATOCHEZIA: 0
CHILLS: 0
FEVER: 0
EYE PAIN: 0
ABDOMINAL PAIN: 0
NERVOUS/ANXIOUS: 1
HEMATURIA: 0
CONSTIPATION: 0
DIZZINESS: 0
FREQUENCY: 0
DIARRHEA: 0

## 2018-11-02 ASSESSMENT — PAIN SCALES - GENERAL: PAINLEVEL: NO PAIN (0)

## 2018-11-02 NOTE — MR AVS SNAPSHOT
After Visit Summary   11/2/2018    Carolina Celeste    MRN: [id number]           Patient Information     Date Of Birth          1975        Visit Information        Provider Department      11/2/2018 8:20 AM Susan Amaro APRN [facility]        Today's Diagnoses     Encounter for routine adult health examination without abnormal findings    -  1    Intermittent asthma, uncomplicated        Screening for diabetes mellitus        Lipid screening        Need for prophylactic vaccination and inoculation against influenza        Screening for HIV (human immunodeficiency virus)        Immunization not carried out because of patient refusal        History of hysterectomy leaving cervix intact        Encounter for screening mammogram for breast cancer        Perimenopausal symptoms          Care Instructions    Start venlafaxine 37.5 mg daily for perimenopause symptoms.    Blood work today including checking thyroid.    Susan Amaro NP-C        Preventive Health Recommendations  Female Ages 40 to 49    Yearly exam:     See your health care provider every year in order to  1. Review health changes.   2. Discuss preventive care.    3. Review your medicines if your doctor prescribed any.      Get a Pap test every three years (unless you have an abnormal result and your provider advises testing more often).      If you get Pap tests with HPV test, you only need to test every 5 years, unless you have an abnormal result. You do not need a Pap test if your uterus was removed (hysterectomy) and you have not had cancer.      You should be tested each year for STDs (sexually transmitted diseases), if you're at risk.     Ask your doctor if you should have a mammogram.      Have a colonoscopy (test for colon cancer) if someone in your family has had colon cancer or polyps before age 50.       Have a cholesterol test every 5 years.       Have a diabetes test (fasting glucose)  "after age 45. If you are at risk for diabetes, you should have this test every 3 years.    Shots: Get a flu shot each year. Get a tetanus shot every 10 years.     Nutrition:     Eat at least 5 servings of fruits and vegetables each day.    Eat whole-grain bread, whole-wheat pasta and brown rice instead of white grains and rice.    Get adequate Calcium and Vitamin D.      Lifestyle    Exercise at least 150 minutes a week (an average of 30 minutes a day, 5 days a week). This will help you control your weight and prevent disease.    Limit alcohol to one drink per day.    No smoking.     Wear sunscreen to prevent skin cancer.    See your dentist every six months for an exam and cleaning.          Follow-ups after your visit        Who to contact     If you have questions or need follow up information about today's clinic visit or your schedule please contact [facility] directly at [phone].  Normal or non-critical lab and imaging results will be communicated to you by MyChart, letter or phone within 4 business days after the clinic has received the results. If you do not hear from us within 7 days, please contact the clinic through MyChart or phone. If you have a critical or abnormal lab result, we will notify you by phone as soon as possible.  Submit refill requests through [employer] or call your pharmacy and they will forward the refill request to us. Please allow 3 business days for your refill to be completed.          Additional Information About Your Visit        Care EveryWhere ID     This is your Care EveryWhere ID. This could be used by other organizations to access your [facility] medical records  [id number]        Your Vitals Were     Pulse Temperature Respirations Height Last Period BMI (Body Mass Index)    64 98.2  F (36.8  C) (Temporal) 16 5' 4.88\" (1.648 m) 03/07/2009 21.13 kg/m2       Blood Pressure from Last 3 Encounters:   11/02/18 92/64   11/29/17 100/60   07/27/17 124/60    Weight " from Last 3 Encounters:   11/02/18 126 lb 8 oz (57.4 kg)   11/29/17 127 lb 4.8 oz (57.7 kg)   07/27/17 125 lb 9.6 oz (57 kg)              We Performed the Following     Basic metabolic panel     Lipid panel reflex to direct LDL Fasting     TSH with free T4 reflex          Today's Medication Changes          These changes are accurate as of 11/2/18  8:59 AM.  If you have any questions, ask your nurse or doctor.               Start taking these medicines.        Dose/Directions    venlafaxine 37.5 MG 24 hr capsule   Commonly known as:  EFFEXOR-XR   Used for:  Perimenopausal symptoms   Started by:  Susan Amaro APRN CNP        Dose:  37.5 mg   Take 1 capsule (37.5 mg) by mouth daily   Quantity:  30 capsule   Refills:  1            Where to get your medicines      These medications were sent to [facility] - [address]   [address] [place] [address]     Phone:  [phone]     venlafaxine 37.5 MG 24 hr capsule                Primary Care Provider Office Phone # Fax #    JOEL Hardin [phone] [address] [place] [address]        Equal Access to Services     CLIFTON AGUILAR AH: Hadii michael ku hadasho Soomaali, waaxda luqadaha, qaybta kaalmada adeegyada, waxay idiin haycorwinn steve khmag otero. So [facility] [phone].    ATENCIÓN: Si habla español, tiene a ji disposición servicios gratuitos de asistencia lingüística. Llame al [phone].    We comply with applicable federal civil rights laws and [place] laws. We do not discriminate on the basis of race, color, national origin, age, disability, sex, sexual orientation, or gender identity.            Thank you!     Thank you for choosing [facility]  for your care. Our goal is always to provide you with excellent care. Hearing back from our patients is one way we can continue to improve our services. Please take a few minutes to complete the  written survey that you may receive in the mail after your visit with us. Thank you!             Your Updated Medication List - Protect others around you: Learn how to safely use, store and throw away your medicines at www.disposemymeds.org.          This list is accurate as of 11/2/18  8:59 AM.  Always use your most recent med list.                   Brand Name Dispense Instructions for use Diagnosis    hydrOXYzine 25 MG tablet    ATARAX    30 tablet    Take 1-2 tablets (25-50 mg) by mouth every 6 hours as needed for anxiety    Grief reaction, Generalized anxiety disorder       venlafaxine 37.5 MG 24 hr capsule    EFFEXOR-XR    30 capsule    Take 1 capsule (37.5 mg) by mouth daily    Perimenopausal symptoms       VENTOLIN  (90 Base) MCG/ACT inhaler   Generic drug:  albuterol     1 Inhaler    Inhale 2 puffs into the lungs every 4 hours as needed for shortness of breath / dyspnea or wheezing . Appointment needed for additional refills.    Intermittent asthma, uncomplicated

## 2018-11-02 NOTE — LETTER
November 5, 2018      Carolina Celeste  [id number] [address] [place] [id number]        Dear ,    We are writing to inform you of your test results.    Your test results fall within the expected range(s) or remain unchanged from previous results.  Please continue with current treatment plan. Your LDL which is the bad cholesterol was a little elevated at 116 - normal is below 100 so I recommend reducing cholesterol in diet and exercise to help lower this.    Resulted Orders   Lipid panel reflex to direct LDL Fasting   Result Value Ref Range    Cholesterol 194 <200 mg/dL    Triglycerides 58 <150 mg/dL      Comment:      Fasting specimen    HDL Cholesterol 66 >49 mg/dL    LDL Cholesterol Calculated 116 (H) <100 mg/dL      Comment:      Above desirable:  100-129 mg/dl  Borderline High:  130-159 mg/dL  High:             160-189 mg/dL  Very high:       >189 mg/dl      Non HDL Cholesterol 128 <130 mg/dL   Basic metabolic panel   Result Value Ref Range    Sodium 141 133 - 144 mmol/L    Potassium 4.0 3.4 - 5.3 mmol/L    Chloride 106 94 - 109 mmol/L    Carbon Dioxide 28 20 - 32 mmol/L    Anion Gap 7 3 - 14 mmol/L    Glucose 88 70 - 99 mg/dL      Comment:      Fasting specimen    Urea Nitrogen 13 7 - 30 mg/dL    Creatinine 0.77 0.52 - 1.04 mg/dL    GFR Estimate 81 >60 mL/min/1.7m2      Comment:      Non  GFR Calc    GFR Estimate If Black >90 >60 mL/min/1.7m2      Comment:       GFR Calc    Calcium 8.9 8.5 - 10.1 mg/dL   TSH with free T4 reflex   Result Value Ref Range    TSH 1.41 0.40 - 4.00 mU/L       If you have any questions or concerns, please call the clinic at the number listed above.       Sincerely,        JOEL Ch CNP

## 2018-11-03 PROBLEM — Z87.891 HISTORY OF TOBACCO USE, PRESENTING HAZARDS TO HEALTH: Status: ACTIVE | Noted: 2018-11-03

## 2018-11-03 RX ORDER — ALBUTEROL SULFATE 90 UG/1
2 AEROSOL, METERED RESPIRATORY (INHALATION) EVERY 4 HOURS PRN
Qty: 1 INHALER | Refills: 6 | Status: SHIPPED | OUTPATIENT
Start: 2018-11-03 | End: 2019-01-25

## 2018-11-03 ASSESSMENT — ASTHMA QUESTIONNAIRES: ACT_TOTALSCORE: 17

## 2018-11-07 ENCOUNTER — TELEPHONE (OUTPATIENT)
Dept: FAMILY MEDICINE | Facility: OTHER | Age: 43
End: 2018-11-07

## 2018-11-07 NOTE — TELEPHONE ENCOUNTER
This PA request was submitted to the insurance by the Central Prior Authorization Team.  If you have any questions in regards to this request, we can be reached at [phone].     Thanks    PA Initiation    Medication: VENTOLIN  (90 Base) MCG/ACT inhaler  Insurance Company: [employer] Phone [phone] Fax [phone]  Pharmacy Filling the Rx: [facility]  [facility] - [address]   Filling Pharmacy Phone: [phone]  Filling Pharmacy Fax:    Start Date: 11/7/2018

## 2018-11-07 NOTE — TELEPHONE ENCOUNTER
Prior Authorization Retail Medication Request    Medication/Dose: PA for Ventolin  ICD code (if different than what is on RX):  Intermittent asthma, uncomplicated (J45.20)  Previously Tried and Failed:  Proair not working for patient, try for pa coverage      Insurance Name:  [facility] [phone]  Insurance ID:  [id number]

## 2018-11-08 ENCOUNTER — TELEPHONE (OUTPATIENT)
Dept: FAMILY MEDICINE | Facility: OTHER | Age: 43
End: 2018-11-08

## 2018-11-08 NOTE — TELEPHONE ENCOUNTER
Reason for Call:  Request for results: Labwork    Name of test or procedure: Labs    Date of test of procedure: 11/02/18    Location of the test or procedure: [facility]    OK to leave the result message on voice mail or with a family member? YES    Phone number Patient can be reached at:  Home number on file [phone] (home)    Additional comments: Pt wants to start taking medication but was told to wait until thyroid results are back. Pt has not received results. Please call to advise.    Call taken on 11/8/2018 at 3:26 PM by Anabell Reese

## 2018-11-08 NOTE — TELEPHONE ENCOUNTER
Left message for patient relaying lab message below:    Notes Recorded by Susan Amaro APRN CNP on 11/4/2018 at 11:10 AM  Please call Carolina and let her know her thyroid is normal. Her cholesterol is 194 which is in the normal range. Her LDL which is the bad cholesterol was a little elevated at 116 - normal is below 100 so I recommend reducing cholesterol in diet and exercise to help lower this.  Susan Amaro, SARY-C      Melvin Lombardo,

## 2018-11-12 NOTE — TELEPHONE ENCOUNTER
PRIOR AUTHORIZATION DENIED    Medication: VENTOLIN  (90 Base) MCG/ACT inhaler - DENIED     Denial Date: 11/9/2018    Denial Rational: You must first try and fail ALL of the formulary alternatives. You tried and failed Proair HFA, but you must also try and fail  Proair Respiclick and Levalbuterol tartrate CFC-free aerosol           Appeal Information: Please provide a letter of medical necessity

## 2018-11-12 NOTE — TELEPHONE ENCOUNTER
Please call patient and let her know that the prior authorization for ventolin was denied.  Thanks,  Susan Amaro, CNP

## 2018-11-13 NOTE — TELEPHONE ENCOUNTER
Left message for patient to return call to clinic. When call is returned please Mary Ann Alok's message below.     Melvin Lombardo,

## 2018-12-13 ENCOUNTER — TELEPHONE (OUTPATIENT)
Dept: FAMILY MEDICINE | Facility: OTHER | Age: 43
End: 2018-12-13

## 2018-12-13 NOTE — TELEPHONE ENCOUNTER
Summary:    Patient is due/failing the following:   ACT    Action needed:   Patient needs to do ACT.    Type of outreach:    Sent letter.    Questions for provider review:    None                                                                                                                                    Afsaneh Funmilayo     Chart routed to Care Team .          Panel Management Review      Patient has the following on her problem list:     Asthma review     ACT Total Scores 11/2/2018   ACT TOTAL SCORE -   ASTHMA ER VISITS -   ASTHMA HOSPITALIZATIONS -   ACT TOTAL SCORE (Goal Greater than or Equal to 20) 17   In the past 12 months, how many times did you visit the emergency room for your asthma without being admitted to the hospital? 0   In the past 12 months, how many times were you hospitalized overnight because of your asthma? 0      1. Is Asthma diagnosis on the Problem List? Yes    2. Is Asthma listed on Health Maintenance? Yes    3. Patient is due for:  ACT      Composite cancer screening  Chart review shows that this patient is due/due soon for the following None

## 2018-12-13 NOTE — LETTER
[facility]  [address] [address]  Phone: [phone]  December 13, 2018      Carolina Celeste  [address] [address]      Dear Carolina,    We care about your health and have reviewed your health plan including your medical conditions, medications, and lab results.  Based on this review, it is recommended that you follow up regarding the following health topic(s):  -Asthma    We recommend you take the following action(s):   -Complete and return the attached ASTHMA CONTROL TEST.  If your total score is 19 or less or you have been to the ER or urgent care for your asthma, then please schedule an asthma followup appointment.     Please call us at the [facility] - [phone] (or use [employer]) to address the above recommendations.     Thank you for trusting [facility] and we appreciate the opportunity to serve you.  We look forward to supporting your healthcare needs in the future.    Healthy Regards,    Your Health Care Team  [facility]

## 2019-01-11 ENCOUNTER — TELEPHONE (OUTPATIENT)
Dept: FAMILY MEDICINE | Facility: OTHER | Age: 44
End: 2019-01-11

## 2019-01-11 DIAGNOSIS — J45.20 MILD INTERMITTENT ASTHMA WITHOUT COMPLICATION: Primary | ICD-10-CM

## 2019-01-11 NOTE — TELEPHONE ENCOUNTER
Prior Authorization Retail Medication Request    Medication/Dose: ventolin  ICD code (if different than what is on RX):    Previously Tried and Failed:  unknown  Rationale:  Ventolin not covered without a prior auth ([employer] preferred).  MD notes have patient request for ventolin only.    Insurance Name:  [employer]  Insurance ID:  [id number]      Pharmacy Information (if different than what is on RX)  Name:    Phone:

## 2019-01-16 NOTE — TELEPHONE ENCOUNTER
Central Prior Authorization Team   Phone: [phone]      PA Initiation    Medication: ventolin  Insurance Company: [employer] - Phone [phone] Fax [phone]  Pharmacy Filling the Rx: [facility] - [id number]  Filling Pharmacy Phone: [phone]  Filling Pharmacy Fax:    Start Date: 1/16/2019

## 2019-01-18 NOTE — TELEPHONE ENCOUNTER
PRIOR AUTHORIZATION DENIED    Medication: ventolin    Denial Date: 1/17/2019    Denial Rational:  Must try/fail all covered alternatives.     Appeal Information:    If you would like to appeal, please supply P/A team with a letter of medical necessity with clinical reason.

## 2019-01-25 RX ORDER — LEVALBUTEROL TARTRATE 45 UG/1
2 AEROSOL, METERED ORAL EVERY 4 HOURS PRN
Qty: 1 INHALER | Refills: 5 | Status: SHIPPED | OUTPATIENT
Start: 2019-01-25 | End: 2019-11-27

## 2019-01-25 NOTE — TELEPHONE ENCOUNTER
PA was denied for ventolin. She has to try levalbuterol (Xopenex) and fail this before they will consider an appeal for the ventolin.  Please call patient to let her know I sent in a prescription for Xopenex.  Thanks,  Susan Amaro, CNP

## 2019-02-04 ENCOUNTER — TELEPHONE (OUTPATIENT)
Dept: FAMILY MEDICINE | Facility: OTHER | Age: 44
End: 2019-02-04

## 2019-02-04 DIAGNOSIS — N95.1 PERIMENOPAUSAL SYMPTOMS: ICD-10-CM

## 2019-02-05 RX ORDER — VENLAFAXINE HYDROCHLORIDE 37.5 MG/1
CAPSULE, EXTENDED RELEASE ORAL
Qty: 30 CAPSULE | Refills: 0 | Status: SHIPPED | OUTPATIENT
Start: 2019-02-05 | End: 2019-02-06

## 2019-02-05 NOTE — TELEPHONE ENCOUNTER
Spoke to patient and relayed message. Patient expressed understanding. Telephone visit scheduled for tomorrow.

## 2019-02-05 NOTE — TELEPHONE ENCOUNTER
Please call patient to schedule phone or office visit for follow up on Effexor. I sent in a one month refill to get her by until then.  KHLOE DaleC

## 2019-02-05 NOTE — TELEPHONE ENCOUNTER
Effexor  Routing refill request to provider for review/approval because:  Patient needs to be seen because:  Overdue for med follow up    Sindi Garcia RN, BSN

## 2019-02-06 ENCOUNTER — VIRTUAL VISIT (OUTPATIENT)
Dept: FAMILY MEDICINE | Facility: OTHER | Age: 44
End: 2019-02-06
Payer: COMMERCIAL

## 2019-02-06 DIAGNOSIS — N95.1 PERIMENOPAUSAL SYMPTOMS: ICD-10-CM

## 2019-02-06 DIAGNOSIS — F41.1 GENERALIZED ANXIETY DISORDER: Primary | ICD-10-CM

## 2019-02-06 PROCEDURE — 99441 ZZC PHYSICIAN TELEPHONE EVALUATION 5-10 MIN: CPT | Performed by: STUDENT IN AN ORGANIZED HEALTH CARE EDUCATION/TRAINING PROGRAM

## 2019-02-06 RX ORDER — VENLAFAXINE HYDROCHLORIDE 37.5 MG/1
37.5 CAPSULE, EXTENDED RELEASE ORAL DAILY
Qty: 90 CAPSULE | Refills: 3 | Status: SHIPPED | OUTPATIENT
Start: 2019-02-06 | End: 2019-11-27

## 2019-02-06 ASSESSMENT — ANXIETY QUESTIONNAIRES
2. NOT BEING ABLE TO STOP OR CONTROL WORRYING: NOT AT ALL
7. FEELING AFRAID AS IF SOMETHING AWFUL MIGHT HAPPEN: NOT AT ALL
6. BECOMING EASILY ANNOYED OR IRRITABLE: NOT AT ALL
3. WORRYING TOO MUCH ABOUT DIFFERENT THINGS: NOT AT ALL
5. BEING SO RESTLESS THAT IT IS HARD TO SIT STILL: NOT AT ALL
IF YOU CHECKED OFF ANY PROBLEMS ON THIS QUESTIONNAIRE, HOW DIFFICULT HAVE THESE PROBLEMS MADE IT FOR YOU TO DO YOUR WORK, TAKE CARE OF THINGS AT HOME, OR GET ALONG WITH OTHER PEOPLE: NOT DIFFICULT AT ALL
GAD7 TOTAL SCORE: 0
1. FEELING NERVOUS, ANXIOUS, OR ON EDGE: NOT AT ALL

## 2019-02-06 ASSESSMENT — PATIENT HEALTH QUESTIONNAIRE - PHQ9
SUM OF ALL RESPONSES TO PHQ QUESTIONS 1-9: 0
5. POOR APPETITE OR OVEREATING: NOT AT ALL

## 2019-02-06 NOTE — PROGRESS NOTES
"Carolina Celeste is a 43 year old female who is being evaluated via a billable telephone visit.      The patient has been notified of following:     \"This telephone visit will be conducted via a call between you and your physician/provider. We have found that certain health care needs can be provided without the need for a physical exam.  This service lets us provide the care you need with a short phone conversation.  If a prescription is necessary we can send it directly to your pharmacy.  If lab work is needed we can place an order for that and you can then stop by our lab to have the test done at a later time.    If during the course of the call the physician/provider feels a telephone visit is not appropriate, you will not be charged for this service.\"     Consent has been obtained for this service by 1 care team member: yes. See the scanned image in the medical record.    Carolina Celeste complains of    Chief Complaint   Patient presents with     Depression       I have reviewed and updated the patient's Past Medical History, Social History, Family History and Medication List.    ALLERGIES  Sunflower seed [sunflower oil] and Latex    Charu Goldstein MA    (MA signature)    Additional provider notes:   Patient started Effexor for perimenopausal symptoms and depression. She started it in November and reports she hasn't cried. She feels very mellow and happy. Her s/o feels like it is a miracle drug because of the changes he has seen. She is now getting out of bed and has energy. She continues to have hot flashes occasionally but she is not interested in work-up for menopause or hormone therapy if it were indicated.      Assessment/Plan:  (N95.1) Perimenopausal symptoms  Plan: venlafaxine (EFFEXOR-XR) 37.5 MG 24 hr capsule    1. Perimenopausal symptoms  Comment: Improved. Continue current medication and dose. Follow up in one year, sooner if needed.  PHQ-9 SCORE 7/28/2017 11/29/2017 2/6/2019   PHQ-9 Total Score " MyChart - 6 (Mild depression) -   PHQ-9 Total Score 18 6 0     SERGE-7 SCORE 7/28/2017 11/29/2017 2/6/2019   Total Score - 6 (mild anxiety) -   Total Score 18 6 0     - venlafaxine (EFFEXOR-XR) 37.5 MG 24 hr capsule; Take 1 capsule (37.5 mg) by mouth daily  Dispense: 90 capsule; Refill: 3    2. Generalized anxiety disorder  See above  - venlafaxine (EFFEXOR-XR) 37.5 MG 24 hr capsule; Take 1 capsule (37.5 mg) by mouth daily  Dispense: 90 capsule; Refill: 3      I have reviewed the note as documented above.  This accurately captures the substance of my conversation with the patient.      Total time of call between patient and provider was 5 minutes     Susan Amaro NP-C

## 2019-02-06 NOTE — LETTER
My Asthma Action Plan  Name: Carolina Celeste   YOB: 1975  Date: 2/6/2019   My doctor: JOEL Ch CNP   My clinic: [facility]        My Control Medicine: None  My Rescue Medicine: Levalbuterol (Xopenex) inhaler  as needed   My Asthma Severity: intermittent  Avoid your asthma triggers:   pollens            GREEN ZONE   Good Control    I feel good    No cough or wheeze    Can work, sleep and play without asthma symptoms       Take your asthma control medicine every day.     1. If exercise triggers your asthma, take your rescue medication    15 minutes before exercise or sports, and    During exercise if you have asthma symptoms  2. Spacer to use with inhaler: If you have a spacer, make sure to use it with your inhaler             YELLOW ZONE Getting Worse  I have ANY of these:    I do not feel good    Cough or wheeze    Chest feels tight    Wake up at night   1. Keep taking your Green Zone medications  2. Start taking your rescue medicine:    every 20 minutes for up to 1 hour. Then every 4 hours for 24-48 hours.  3. If you stay in the Yellow Zone for more than 12-24 hours, contact your doctor.  4. If you do not return to the Green Zone in 12-24 hours or you get worse, start taking your oral steroid medicine if prescribed by your provider.           RED ZONE Medical Alert - Get Help  I have ANY of these:    I feel awful    Medicine is not helping    Breathing getting harder    Trouble walking or talking    Nose opens wide to breathe       1. Take your rescue medicine NOW  2. If your provider has prescribed an oral steroid medicine, start taking it NOW  3. Call your doctor NOW  4. If you are still in the Red Zone after 20 minutes and you have not reached your doctor:    Take your rescue medicine again and    Call 911 or go to the emergency room right away    See your regular doctor within 2 weeks of an Emergency Room or Urgent Care visit for follow-up treatment.           Annual Reminders:  Meet with Asthma Educator,  Flu Shot in the Fall, consider Pneumonia Vaccination for patients with asthma (aged 19 and older).    Pharmacy:    [facility] [place], [place] - [address] [facility] [place], [place] - [address] [place] [address] [place] [address] - [address] [place] [address] PHARM[address] [place] [address] [facility]                      Asthma Triggers  How To Control Things That Make Your Asthma Worse    Triggers are things that make your asthma worse.  Look at the list below to help you find your triggers and what you can do about them.  You can help prevent asthma flare-ups by staying away from your triggers.      Trigger                                                          What you can do   Cigarette Smoke  Tobacco smoke can make asthma worse. Do not allow smoking in your home, car or around you.  Be sure no one smokes at a child s day care or school.  If you smoke, ask your health care provider for ways to help you quit.  Ask family members to quit too.  Ask your health care provider for a referral to Quit Plan to help you quit smoking, or call 2-129-357-PLAN.     Colds, Flu, Bronchitis  These are common triggers of asthma. Wash your hands often.  Don t touch your eyes, nose or mouth.  Get a flu shot every year.     Dust Mites  These are tiny bugs that live in cloth or carpet. They are too small to see. Wash sheets and blankets in hot water every week.   Encase pillows and mattress in dust mite proof covers.  Avoid having carpet if you can. If you have carpet, vacuum weekly.   Use a dust mask and HEPA vacuum.   Pollen and Outdoor Mold  Some people are allergic to trees, grass, or weed pollen, or molds. Try to keep your windows closed.  Limit time out doors when pollen count is high.   Ask you health care provider about taking medicine during allergy season.     Animal Dander  Some people are allergic to skin flakes, urine or  saliva from pets with fur or feathers. Keep pets with fur or feathers out of your home.    If you can t keep the pet outdoors, then keep the pet out of your bedroom.  Keep the bedroom door closed.  Keep pets off cloth furniture and away from stuffed toys.     Mice, Rats, and Cockroaches  Some people are allergic to the waste from these pests.   Cover food and garbage.  Clean up spills and food crumbs.  Store grease in the refrigerator.   Keep food out of the bedroom.   Indoor Mold  This can be a trigger if your home has high moisture. Fix leaking faucets, pipes, or other sources of water.   Clean moldy surfaces.  Dehumidify basement if it is damp and smelly.   Smoke, Strong Odors, and Sprays  These can reduce air quality. Stay away from strong odors and sprays, such as perfume, powder, hair spray, paints, smoke incense, paint, cleaning products, candles and new carpet.   Exercise or Sports  Some people with asthma have this trigger. Be active!  Ask your doctor about taking medicine before sports or exercise to prevent symptoms.    Warm up for 5-10 minutes before and after sports or exercise.     Other Triggers of Asthma  Cold air:  Cover your nose and mouth with a scarf.  Sometimes laughing or crying can be a trigger.  Some medicines and food can trigger asthma.

## 2019-02-08 ASSESSMENT — ANXIETY QUESTIONNAIRES: GAD7 TOTAL SCORE: 0

## 2019-02-15 ENCOUNTER — TELEPHONE (OUTPATIENT)
Dept: FAMILY MEDICINE | Facility: OTHER | Age: 44
End: 2019-02-15

## 2019-02-15 NOTE — TELEPHONE ENCOUNTER
Reason for call:  Medication  Reason for Call:  Medication or medication refill:    Do you use a [facility]?  Name of the pharmacy and phone number for the current request:  [facility] - [phone]    Name of the medication requested: travel patch     Other request: pt is leaving on a cruise next Friday and was wondering if she can get a patch. She completley forgot to mention this when she had telephone visit last week. Please advise.     Can we leave a detailed message on this number? YES    Phone number patient can be reached at: Cell number on file:    Telephone Information:   Mobile [phone]       Best Time: anytime    Call taken on 2/15/2019 at 11:08 AM by Rosina Mead

## 2019-02-18 NOTE — TELEPHONE ENCOUNTER
Reason for Call:  Other returning call    Detailed comments: Patient called clinic back and declined scheduling another appointment.     Phone Number Patient can be reached at: Home number on file [phone] (home)    Best Time: ---    Can we leave a detailed message on this number? Not Applicable    Call taken on 2/18/2019 at 4:53 PM by Viktor Bermudez

## 2019-03-11 ENCOUNTER — TELEPHONE (OUTPATIENT)
Dept: FAMILY MEDICINE | Facility: OTHER | Age: 44
End: 2019-03-11

## 2019-03-11 NOTE — LETTER
[facility]  [address] [address]  Phone: [phone]  March 11, 2019      Carolina Celeste  [address] [facility] [address]      Dear Carolina,    We care about your health and have reviewed your health plan including your medical conditions, medications, and lab results.  Based on this review, it is recommended that you follow up regarding the following health topic(s):  -Asthma    We recommend you take the following action(s):   -Complete and return the attached ASTHMA CONTROL TEST.  If your total score is 19 or less or you have been to the ER or urgent care for your asthma, then please schedule an asthma followup appointment.     Please call us at the [facility] - [phone] (or use "[email or web address] to address the above recommendations.     Thank you for trusting [facility] and we appreciate the opportunity to serve you.  We look forward to supporting your healthcare needs in the future.    Healthy Regards,    Your Health Care Team  [facility]

## 2019-03-28 DIAGNOSIS — N95.1 PERIMENOPAUSAL SYMPTOMS: ICD-10-CM

## 2019-03-29 RX ORDER — VENLAFAXINE HYDROCHLORIDE 37.5 MG/1
CAPSULE, EXTENDED RELEASE ORAL
Qty: 30 CAPSULE | Refills: 0 | OUTPATIENT
Start: 2019-03-29

## 2019-03-29 NOTE — TELEPHONE ENCOUNTER
Effexor  Sent 2/6/19 with 1 year supply. Refill not appropriate at this time.     Sindi Garcia, RN, BSN

## 2019-06-11 ENCOUNTER — TELEPHONE (OUTPATIENT)
Dept: FAMILY MEDICINE | Facility: OTHER | Age: 44
End: 2019-06-11

## 2019-06-11 NOTE — LETTER
[facility]  [address] [address]  Phone: [phone]  June 11, 2019      Carolina Celeste  [address] [facility] [address]      Dear Carolina,    We care about your health and have reviewed your health plan including your medical conditions, medications, and lab results.  Based on this review, it is recommended that you follow up regarding the following health topic(s):  -Asthma    We recommend you take the following action(s):   -Complete and return the attached ASTHMA CONTROL TEST.  If your total score is 19 or less or you have been to the ER or urgent care for your asthma, then please schedule an asthma followup appointment.     Please call us at the [facility] - [phone] (or use [email or web address]) to address the above recommendations.     Thank you for trusting [facility] and we appreciate the opportunity to serve you.  We look forward to supporting your healthcare needs in the future.    Healthy Regards,    Your Health Care Team  [facility]

## 2019-07-17 ENCOUNTER — TELEPHONE (OUTPATIENT)
Dept: FAMILY MEDICINE | Facility: OTHER | Age: 44
End: 2019-07-17

## 2019-07-17 NOTE — LETTER
[facility]  [address] [address]  Phone: [phone]  July 17, 2019      Carolina Celeste  [address] [facility] [address]      Dear Carolina,    We care about your health and have reviewed your health plan including your medical conditions, medications, and lab results.  Based on this review, it is recommended that you follow up regarding the following health topic(s):  -Asthma    We recommend you take the following action(s):   -Complete and return the attached ASTHMA CONTROL TEST.  If your total score is 19 or less or you have been to the ER or urgent care for your asthma, then please schedule an asthma followup appointment.     Please call us at the [facility] - [phone] (or use [employer]) to address the above recommendations.     Thank you for trusting [facility] and we appreciate the opportunity to serve you.  We look forward to supporting your healthcare needs in the future.    Healthy Regards,    Your Health Care Team  [facility]

## 2019-07-17 NOTE — TELEPHONE ENCOUNTER
Panel Management Review      Patient has the following on her problem list:     Asthma review     ACT Total Scores 11/2/2018   ACT TOTAL SCORE -   ASTHMA ER VISITS -   ASTHMA HOSPITALIZATIONS -   ACT TOTAL SCORE (Goal Greater than or Equal to 20) 17   In the past 12 months, how many times did you visit the emergency room for your asthma without being admitted to the hospital? 0   In the past 12 months, how many times were you hospitalized overnight because of your asthma? 0      1. Is Asthma diagnosis on the Problem List? Yes    2. Is Asthma listed on Health Maintenance? Yes    3. Patient is due for:  ACT      Composite cancer screening  Chart review shows that this patient is due/due soon for the following None  Summary:    Patient is due/failing the following:   ACT  Physical due in November   Action needed:   Patient needs to do ACT.    Type of outreach:    phone person answered and hung up  Reminder letter sent  Questions for provider review:    None                                                                                                                                    Afsaneh Mello       Chart routed to Care Team .

## 2019-11-21 ENCOUNTER — TELEPHONE (OUTPATIENT)
Dept: FAMILY MEDICINE | Facility: OTHER | Age: 44
End: 2019-11-21

## 2019-11-21 DIAGNOSIS — J45.20 INTERMITTENT ASTHMA, UNCOMPLICATED: ICD-10-CM

## 2019-11-21 RX ORDER — ALBUTEROL SULFATE 90 UG/1
AEROSOL, METERED RESPIRATORY (INHALATION)
Qty: 18 G | Refills: 0 | Status: SHIPPED | OUTPATIENT
Start: 2019-11-21 | End: 2019-11-27

## 2019-11-22 NOTE — TELEPHONE ENCOUNTER
Please call. Patient due for annual physical and ACT. I will send in one refill of inhaler so that she does not run out.  Mary Ann Amaro, CNP

## 2019-11-22 NOTE — TELEPHONE ENCOUNTER
Pending Prescriptions:                       Disp   Refills    VENTOLIN  (90 Base) MCG/ACT inhaler*18 g   6        Sig: INHALE 2 PUFFS INTO THE LUNGS EVERY 4 HOURS AS NEEDED           FOR SHORTNESS OF BREATH, DIFFICULTY BREATHING OR           WHEEZING.    Routing refill request to provider for review/approval because:  Asthma Maintenance Inhalers - Anticholinergics Zighqi78/21 10:29 AM   Asthma control assessment score within normal limits in last 6 months    Medication is active on med list    Recent (6 mo) or future (30 days) visit within the authorizing provider's specialty

## 2019-11-25 NOTE — PROGRESS NOTES
SUBJECTIVE:   CC: Carolina Celeste is an 44 year old woman who presents for preventive health visit.     Healthy Habits:     Getting at least 3 servings of Calcium per day:  Yes    Bi-annual eye exam:  Yes    Dental care twice a year:  NO    Sleep apnea or symptoms of sleep apnea:  None    Diet:  Regular (no restrictions)    Frequency of exercise:  2-3 days/week    Duration of exercise:  30-45 minutes    Taking medications regularly:  Yes    PHQ-2 Total Score: 0    Additional concerns today:  No    -early stages of menopause, always fatigued. She has had problems since dealing with menopause.  She has hot flashes, mood swings, difficulty sleeping.  She was started on Effexor to help with her menopause symptoms and while she was dealing with death of nephew.  She denies depression/anxiety.  Feels like the medication really helped but she is still feeling fatigued.  Takes Effexor in the AM and tried in the PM but didn't change her fatigue or insomnia.  Her insomnia is just in the last 2 years since her perimenopausal symptoms started.  Difficulty staying asleep, easy to fall asleep.  Will wake up 2-3 times at night and be up for 2-3 hours.  She wakes up wanting to go back to bed, no AM headaches.  No apnea or excessive snoring.     Asthma well controlled. Uses albuterol frequently because it is induced by exercise.     Today's PHQ-2 Score:   PHQ-2 (  [employer]) 2019   Q1: Little interest or pleasure in doing things 0   Q2: Feeling down, depressed or hopeless 0   PHQ-2 Score 0   Q1: Little interest or pleasure in doing things Not at all   Q2: Feeling down, depressed or hopeless Not at all   PHQ-2 Score 0     Abuse: Current or Past(Physical, Sexual or Emotional)- No  Do you feel safe in your environment? Yes    Social History     Tobacco Use     Smoking status: Former Smoker     Packs/day: 0.10     Years: 4.00     Pack years: 0.40     Last attempt to quit: 2011     Years since quittin.9     Smokeless  tobacco: Never Used     Tobacco comment: Quit    Substance Use Topics     Alcohol use: Yes     Alcohol/week: 0.0 standard drinks     Comment: very little     Alcohol Use 2019   Prescreen: >3 drinks/day or >7 drinks/week? No   Prescreen: >3 drinks/day or >7 drinks/week? -     Reviewed orders with patient.  Reviewed health maintenance and updated orders accordingly - Yes  Labs reviewed in EPIC  BP Readings from Last 3 Encounters:   19 116/84   18 92/64   17 100/60    Wt Readings from Last 3 Encounters:   19 59.1 kg (130 lb 6.4 oz)   18 57.4 kg (126 lb 8 oz)   17 57.7 kg (127 lb 4.8 oz)                  Patient Active Problem List   Diagnosis     Allergic rhinitis     Generalized anxiety disorder     Intermittent asthma     Immunization not carried out because of patient refusal     History of hysterectomy leaving cervix intact     History of tobacco use, presenting hazards to health     Perimenopausal symptoms     Insomnia, unspecified type     Past Surgical History:   Procedure Laterality Date     LAPAROSCOPY,VAG HYSTERECTOMY  2009    supracervical hyst.       Social History     Tobacco Use     Smoking status: Former Smoker     Packs/day: 0.10     Years: 4.00     Pack years: 0.40     Last attempt to quit: 2011     Years since quittin.9     Smokeless tobacco: Never Used     Tobacco comment: Quit    Substance Use Topics     Alcohol use: Yes     Alcohol/week: 0.0 standard drinks     Comment: very little     Family History   Problem Relation Age of Onset     Lipids Father      Hypertension Father      Psychotic Disorder Father         [place]     Neurologic Disorder Maternal Grandmother         Parkinsons     Depression Maternal Grandmother      Hypertension Maternal Grandmother      Alzheimer Disease Maternal Grandfather      Genitourinary Problems Paternal Grandfather         Cirrohosis of LIver     Breast Cancer Other      Cancer Maternal Uncle          Stomach/Esophagus     Breast Cancer Maternal Aunt      Cancer Maternal Uncle         stomach     Cancer - colorectal No family hx of          Current Outpatient Medications   Medication Sig Dispense Refill     albuterol (VENTOLIN HFA) 108 (90 Base) MCG/ACT inhaler INHALE 2 PUFFS INTO THE LUNGS EVERY 4 HOURS AS NEEDED FOR SHORTNESS OF BREATH, DIFFICULTY BREATHING OR WHEEZING. 18 g 3     traZODone (DESYREL) 50 MG tablet Take 0.5-1 tablets (25-50 mg) by mouth At Bedtime 30 tablet 0     venlafaxine (EFFEXOR-XR) 37.5 MG 24 hr capsule Take 1 capsule (37.5 mg) by mouth daily 90 capsule 3     Allergies   Allergen Reactions     Sunflower Seed [Sunflower Oil]      Throat itching, wheezing     Latex        Mammogram Screening: Patient under age 50, mutual decision reflected in health maintenance.      Pertinent mammograms are reviewed under the imaging tab.  History of abnormal Pap smear: NO - age 30-65 PAP every 5 years with negative HPV co-testing recommended  PAP / HPV Latest Ref Rng & Units 11/29/2017 9/9/2016 1/13/2014   PAP - NIL NIL NIL   HPV 16 DNA NEG:Negative Negative Negative -   HPV 18 DNA NEG:Negative Negative Negative -   OTHER HR HPV NEG:Negative Negative Negative -     Reviewed and updated as needed this visit by clinical staff  Tobacco  Allergies  Meds  Med Hx  Surg Hx  Fam Hx  Soc Hx        Reviewed and updated as needed this visit by Provider          Review of Systems   Constitutional: Positive for fatigue. Negative for chills and fever.   HENT: Negative for congestion, ear pain, hearing loss and sore throat.    Eyes: Negative for pain and visual disturbance.   Respiratory: Negative for cough and shortness of breath.    Cardiovascular: Negative for chest pain, palpitations and peripheral edema.   Gastrointestinal: Negative for abdominal pain, constipation, diarrhea, heartburn, hematochezia and nausea.   Breasts:  Negative for tenderness, breast mass and discharge.   Genitourinary: Negative for  "dysuria, frequency, genital sores, hematuria, pelvic pain, urgency, vaginal bleeding and vaginal discharge.   Musculoskeletal: Negative for arthralgias, joint swelling and myalgias.   Skin: Negative for rash.   Neurological: Negative for dizziness, weakness, headaches and paresthesias.   Psychiatric/Behavioral: Positive for sleep disturbance. Negative for mood changes. The patient is not nervous/anxious.         OBJECTIVE:   /84   Pulse 70   Temp 98.5  F (36.9  C) (Oral)   Resp 16   Ht 1.645 m (5' 4.76\")   Wt 59.1 kg (130 lb 6.4 oz)   LMP 03/07/2009   SpO2 100%   BMI 21.86 kg/m    Physical Exam  GENERAL: healthy, alert and no distress  EYES: Eyes grossly normal to inspection, PERRL and conjunctivae and sclerae normal  HENT: ear canals and TM's normal, nose and mouth without ulcers or lesions  NECK: no adenopathy, no asymmetry, masses, or scars and thyroid normal to palpation  RESP: lungs clear to auscultation - no rales, rhonchi or wheezes  BREAST: normal without masses, tenderness or nipple discharge and no palpable axillary masses or adenopathy  CV: regular rate and rhythm, normal S1 S2, no S3 or S4, no murmur, click or rub, no peripheral edema and peripheral pulses strong  ABDOMEN: soft, nontender, no hepatosplenomegaly, no masses and bowel sounds normal   (female): deferred  MS: no gross musculoskeletal defects noted, no edema  SKIN: no suspicious lesions or rashes  NEURO: Normal strength and tone, mentation intact and speech normal  PSYCH: mentation appears normal, affect normal/bright    Diagnostic Test Results:  Labs reviewed in Epic  No results found for this or any previous visit (from the past 24 hour(s)).    ASSESSMENT/PLAN:       ICD-10-CM    1. Routine general medical examination at a health care facility Z00.00    2. CARDIOVASCULAR SCREENING; LDL GOAL LESS THAN 160 Z13.6 Lipid panel reflex to direct LDL Fasting   3. Screening for diabetes mellitus Z13.1 GLUCOSE   4. Visit for screening " "mammogram Z12.31 MA Screening Digital Bilateral   5. Intermittent asthma, uncomplicated J45.20 albuterol (VENTOLIN HFA) 108 (90 Base) MCG/ACT inhaler   6. Perimenopausal symptoms N95.1 venlafaxine (EFFEXOR-XR) 37.5 MG 24 hr capsule   7. Generalized anxiety disorder F41.1 venlafaxine (EFFEXOR-XR) 37.5 MG 24 hr capsule   8. Insomnia, unspecified type G47.00 traZODone (DESYREL) 50 MG tablet   9. Influenza vaccination declined Z28.21      Asthma:  Well controlled on albuterol.  Needs 1 inhaler every 2 months or so, uses regularly for exercise so has frequent use of inhaler. Otherwise feels like she does well. We discussed importance of influenza vaccination but still declines.     Perimenopausal symptoms:  Effexor has helped a lot for her mood and hot flashes.   Her fatigue is likely a result of her insomnia issues which can be due to menopause/hormone fluctuations.   We discussed treatment options, she feels like there is hangover affect with Melatonin.   Discussed sleep hygiene techniques.   Doesn't have incontinence that is resulting in need to wake up at night.   Will start on Trazodone 25-50 mg once nightly PRN.   Recommended regular use if it helps for 1-3 months to reset her sleep and help with fatigue.   Discussed potential side effects to monitor for.   Continue Effexor for now, refilled medication for year.  Will refill trazodone for a year if tolerating medication.  Low suspicion effexor is contributing to fatigue or insomnia as these symptoms were present prior to starting medication.     COUNSELING:  Reviewed preventive health counseling, as reflected in patient instructions       Regular exercise       Healthy diet/nutrition       Immunizations    Declined: Influenza            (Naa)menopause management    Estimated body mass index is 21.86 kg/m  as calculated from the following:    Height as of this encounter: 1.645 m (5' 4.76\").    Weight as of this encounter: 59.1 kg (130 lb 6.4 oz).         reports " that she quit smoking about 7 years ago. She has a 0.40 pack-year smoking history. She has never used smokeless tobacco.      Counseling Resources:  ATP IV Guidelines  Pooled Cohorts Equation Calculator  Breast Cancer Risk Calculator  FRAX Risk Assessment  ICSI Preventive Guidelines  Dietary Guidelines for Americans, 2010  [employer]'s MyPlate  ASA Prophylaxis  Lung CA Screening    Rosario Canseco PA-C  [facility]

## 2019-11-27 ENCOUNTER — OFFICE VISIT (OUTPATIENT)
Dept: FAMILY MEDICINE | Facility: OTHER | Age: 44
End: 2019-11-27
Payer: COMMERCIAL

## 2019-11-27 VITALS
BODY MASS INDEX: 21.73 KG/M2 | OXYGEN SATURATION: 100 % | HEART RATE: 70 BPM | TEMPERATURE: 98.5 F | WEIGHT: 130.4 LBS | HEIGHT: 65 IN | DIASTOLIC BLOOD PRESSURE: 84 MMHG | SYSTOLIC BLOOD PRESSURE: 116 MMHG | RESPIRATION RATE: 16 BRPM

## 2019-11-27 DIAGNOSIS — Z13.1 SCREENING FOR DIABETES MELLITUS: ICD-10-CM

## 2019-11-27 DIAGNOSIS — G47.00 INSOMNIA, UNSPECIFIED TYPE: ICD-10-CM

## 2019-11-27 DIAGNOSIS — Z00.00 ROUTINE GENERAL MEDICAL EXAMINATION AT A HEALTH CARE FACILITY: Primary | ICD-10-CM

## 2019-11-27 DIAGNOSIS — F41.1 GENERALIZED ANXIETY DISORDER: ICD-10-CM

## 2019-11-27 DIAGNOSIS — Z12.31 VISIT FOR SCREENING MAMMOGRAM: ICD-10-CM

## 2019-11-27 DIAGNOSIS — N95.1 PERIMENOPAUSAL SYMPTOMS: ICD-10-CM

## 2019-11-27 DIAGNOSIS — Z28.21 INFLUENZA VACCINATION DECLINED: ICD-10-CM

## 2019-11-27 DIAGNOSIS — J45.20 INTERMITTENT ASTHMA, UNCOMPLICATED: ICD-10-CM

## 2019-11-27 DIAGNOSIS — Z13.6 CARDIOVASCULAR SCREENING; LDL GOAL LESS THAN 160: ICD-10-CM

## 2019-11-27 LAB
CHOLEST SERPL-MCNC: 218 MG/DL
GLUCOSE SERPL-MCNC: 93 MG/DL (ref 70–99)
HDLC SERPL-MCNC: 79 MG/DL
LDLC SERPL CALC-MCNC: 126 MG/DL
NONHDLC SERPL-MCNC: 139 MG/DL
TRIGL SERPL-MCNC: 65 MG/DL

## 2019-11-27 PROCEDURE — 80061 LIPID PANEL: CPT | Performed by: PHYSICIAN ASSISTANT

## 2019-11-27 PROCEDURE — 99396 PREV VISIT EST AGE 40-64: CPT | Performed by: PHYSICIAN ASSISTANT

## 2019-11-27 PROCEDURE — 36415 COLL VENOUS BLD VENIPUNCTURE: CPT | Performed by: PHYSICIAN ASSISTANT

## 2019-11-27 PROCEDURE — 82947 ASSAY GLUCOSE BLOOD QUANT: CPT | Performed by: PHYSICIAN ASSISTANT

## 2019-11-27 RX ORDER — VENLAFAXINE HYDROCHLORIDE 37.5 MG/1
37.5 CAPSULE, EXTENDED RELEASE ORAL DAILY
Qty: 90 CAPSULE | Refills: 3 | Status: SHIPPED | OUTPATIENT
Start: 2019-11-27 | End: 2020-11-06

## 2019-11-27 RX ORDER — ALBUTEROL SULFATE 90 UG/1
AEROSOL, METERED RESPIRATORY (INHALATION)
Qty: 18 G | Refills: 3 | Status: SHIPPED | OUTPATIENT
Start: 2019-11-27 | End: 2020-09-16

## 2019-11-27 RX ORDER — TRAZODONE HYDROCHLORIDE 50 MG/1
25-50 TABLET, FILM COATED ORAL AT BEDTIME
Qty: 30 TABLET | Refills: 0 | Status: SHIPPED | OUTPATIENT
Start: 2019-11-27 | End: 2020-11-06

## 2019-11-27 ASSESSMENT — ASTHMA QUESTIONNAIRES
QUESTION_3 LAST FOUR WEEKS HOW OFTEN DID YOUR ASTHMA SYMPTOMS (WHEEZING, COUGHING, SHORTNESS OF BREATH, CHEST TIGHTNESS OR PAIN) WAKE YOU UP AT NIGHT OR EARLIER THAN USUAL IN THE MORNING: TWO OR THREE NIGHTS A WEEK
QUESTION_1 LAST FOUR WEEKS HOW MUCH OF THE TIME DID YOUR ASTHMA KEEP YOU FROM GETTING AS MUCH DONE AT WORK, SCHOOL OR AT HOME: NONE OF THE TIME
QUESTION_5 LAST FOUR WEEKS HOW WOULD YOU RATE YOUR ASTHMA CONTROL: WELL CONTROLLED
QUESTION_2 LAST FOUR WEEKS HOW OFTEN HAVE YOU HAD SHORTNESS OF BREATH: ONCE OR TWICE A WEEK
QUESTION_4 LAST FOUR WEEKS HOW OFTEN HAVE YOU USED YOUR RESCUE INHALER OR NEBULIZER MEDICATION (SUCH AS ALBUTEROL): ONE OR TWO TIMES PER DAY
ACT_TOTALSCORE: 17

## 2019-11-27 ASSESSMENT — ENCOUNTER SYMPTOMS
CHILLS: 0
COUGH: 0
EYE PAIN: 0
DIARRHEA: 0
ABDOMINAL PAIN: 0
SORE THROAT: 0
DIZZINESS: 0
WEAKNESS: 0
FREQUENCY: 0
BREAST MASS: 0
CONSTIPATION: 0
HEMATOCHEZIA: 0
ARTHRALGIAS: 0
FATIGUE: 1
HEARTBURN: 0
NAUSEA: 0
SHORTNESS OF BREATH: 0
NERVOUS/ANXIOUS: 0
MYALGIAS: 0
PARESTHESIAS: 0
FEVER: 0
JOINT SWELLING: 0
HEMATURIA: 0
SLEEP DISTURBANCE: 1
DYSURIA: 0
HEADACHES: 0
PALPITATIONS: 0

## 2019-11-27 ASSESSMENT — MIFFLIN-ST. JEOR: SCORE: 1233.62

## 2019-11-28 ASSESSMENT — ASTHMA QUESTIONNAIRES: ACT_TOTALSCORE: 17

## 2019-12-10 ENCOUNTER — HOSPITAL ENCOUNTER (OUTPATIENT)
Dept: MAMMOGRAPHY | Facility: CLINIC | Age: 44
Discharge: HOME | End: 2019-12-10
Attending: PHYSICIAN ASSISTANT | Admitting: PHYSICIAN ASSISTANT
Payer: COMMERCIAL

## 2019-12-10 DIAGNOSIS — Z12.31 VISIT FOR SCREENING MAMMOGRAM: ICD-10-CM

## 2019-12-10 PROCEDURE — 77067 SCR MAMMO BI INCL CAD: CPT

## 2020-02-05 ENCOUNTER — TELEPHONE (OUTPATIENT)
Dept: FAMILY MEDICINE | Facility: OTHER | Age: 45
End: 2020-02-05

## 2020-02-05 NOTE — TELEPHONE ENCOUNTER
Summary:    Patient is due/failing the following:   ACT    Action needed:   Patient needs to do ACT.    Type of outreach:    Sent letter.    Questions for provider review:    None                                                                                                                                    Afsaneh Gudino CMA       Chart routed to Care Team .          Panel Management Review      Patient has the following on her problem list:     Asthma review     ACT Total Scores 11/27/2019   ACT TOTAL SCORE -   ASTHMA ER VISITS -   ASTHMA HOSPITALIZATIONS -   ACT TOTAL SCORE (Goal Greater than or Equal to 20) 17   In the past 12 months, how many times did you visit the emergency room for your asthma without being admitted to the hospital? 0   In the past 12 months, how many times were you hospitalized overnight because of your asthma? 0      1. Is Asthma diagnosis on the Problem List? Yes    2. Is Asthma listed on Health Maintenance? Yes    3. Patient is due for:  ACT      Composite cancer screening  Chart review shows that this patient is due/due soon for the following None

## 2020-02-05 NOTE — LETTER
[facility]  [address] [address]  Phone: [phone]  February 5, 2020      Carolina Celeste  [address] [facility] [address]      Dear Carolina,    We care about your health and have reviewed your health plan including your medical conditions, medications, and lab results.  Based on this review, it is recommended that you follow up regarding the following health topic(s):  -Asthma    We recommend you take the following action(s):   -Complete and return the attached ASTHMA CONTROL TEST.  If your total score is 19 or less or you have been to the ER or urgent care for your asthma, then please schedule an asthma followup appointment.     Please call us at the [facility] - [phone] (or use [employer]) to address the above recommendations.     Thank you for trusting [facility] and we appreciate the opportunity to serve you.  We look forward to supporting your healthcare needs in the future.    Healthy Regards,    Your Health Care Team  [facility]

## 2020-09-09 DIAGNOSIS — J45.20 INTERMITTENT ASTHMA, UNCOMPLICATED: ICD-10-CM

## 2020-09-11 NOTE — TELEPHONE ENCOUNTER
Pending Prescriptions:                       Disp   Refills    albuterol (PROAIR HFA/PROVENTIL HFA/VENTOL*18 g   0        Sig: INHALE 2 PUFFS BY MOUTH EVERY 4 HOURS AS NEEDED FOR           SHORTNESS OF BREATH      Support Staff:   Please call patient to schedule a visit. (F2F, Video, Phone or E-visit) med check  Then ask if patient will need a refill of the above medication before the visit.   Please re-flag for RN and then route to refill pool to give a 30 or 90 day chuckie to get them to their visit or to remove the medication and to close the encounter.    After 3 attempts to reach patient, please route to provider to review and advise.    Thank you!    Edilson Elizondo, BSN, RN, PHN

## 2020-09-16 RX ORDER — ALBUTEROL SULFATE 90 UG/1
AEROSOL, METERED RESPIRATORY (INHALATION)
Qty: 18 G | Refills: 0 | Status: SHIPPED | OUTPATIENT
Start: 2020-09-16 | End: 2020-11-06

## 2020-09-16 NOTE — TELEPHONE ENCOUNTER
Patient called back today, reviewed the notes below. Patient stated that Rosario Canseco saw her and renewed her medication for a year. Patient is wondering why she would need to do a med follow up if her medication doesn't run out tell November. Can someone give her a call. Thank you

## 2020-09-16 NOTE — TELEPHONE ENCOUNTER
Called patient, reviewed the notes below. Patient asked to schedule Physical/Medication/Asthma. Set for 10/30/20. Patient is out of inhaler, thank you for the Evon fill.Thank you

## 2020-09-16 NOTE — TELEPHONE ENCOUNTER
Correct she was given a years supply 11/27/2019. So if she is out now, we would need to see her sooner as she is using them more that directed with possibility that her asthma may need to have a tighter control.  Asthma is a condition that we would like to follow up on every 6 months to make sure they are doing well with their current medication(s) and we also have them fill out an ACT questionnaire as its a consist way to measure how they are doing. We can certainly give her a chuckie (90 day) refill, but would like her to schedule before she is out again and complete the ACT (will need to mail to her).    Let us know if she have further questions.    Edilson Elizondo, KAYLEIGHN, RN, PHN
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
## 2020-11-06 ENCOUNTER — OFFICE VISIT (OUTPATIENT)
Dept: FAMILY MEDICINE | Facility: OTHER | Age: 45
End: 2020-11-06

## 2020-11-06 VITALS
RESPIRATION RATE: 18 BRPM | HEART RATE: 80 BPM | WEIGHT: 131 LBS | TEMPERATURE: 99.3 F | SYSTOLIC BLOOD PRESSURE: 110 MMHG | BODY MASS INDEX: 21.83 KG/M2 | DIASTOLIC BLOOD PRESSURE: 80 MMHG | HEIGHT: 65 IN

## 2020-11-06 DIAGNOSIS — Z13.1 SCREENING FOR DIABETES MELLITUS: ICD-10-CM

## 2020-11-06 DIAGNOSIS — Z12.31 VISIT FOR SCREENING MAMMOGRAM: ICD-10-CM

## 2020-11-06 DIAGNOSIS — N95.1 PERIMENOPAUSAL SYMPTOMS: ICD-10-CM

## 2020-11-06 DIAGNOSIS — F41.1 GENERALIZED ANXIETY DISORDER: ICD-10-CM

## 2020-11-06 DIAGNOSIS — Z13.6 CARDIOVASCULAR SCREENING; LDL GOAL LESS THAN 160: ICD-10-CM

## 2020-11-06 DIAGNOSIS — J45.20 INTERMITTENT ASTHMA, UNCOMPLICATED: ICD-10-CM

## 2020-11-06 DIAGNOSIS — Z00.00 ROUTINE GENERAL MEDICAL EXAMINATION AT A HEALTH CARE FACILITY: Primary | ICD-10-CM

## 2020-11-06 DIAGNOSIS — Z28.21 INFLUENZA VACCINATION DECLINED BY PATIENT: ICD-10-CM

## 2020-11-06 DIAGNOSIS — Z11.59 NEED FOR HEPATITIS C SCREENING TEST: ICD-10-CM

## 2020-11-06 LAB
CHOLEST SERPL-MCNC: 216 MG/DL
GLUCOSE SERPL-MCNC: 102 MG/DL (ref 70–99)
HCV AB SERPL QL IA: NONREACTIVE
HDLC SERPL-MCNC: 79 MG/DL
LDLC SERPL CALC-MCNC: 122 MG/DL
NONHDLC SERPL-MCNC: 137 MG/DL
TRIGL SERPL-MCNC: 75 MG/DL

## 2020-11-06 PROCEDURE — 99396 PREV VISIT EST AGE 40-64: CPT | Performed by: PHYSICIAN ASSISTANT

## 2020-11-06 PROCEDURE — 36415 COLL VENOUS BLD VENIPUNCTURE: CPT | Performed by: PHYSICIAN ASSISTANT

## 2020-11-06 PROCEDURE — 86803 HEPATITIS C AB TEST: CPT | Performed by: PHYSICIAN ASSISTANT

## 2020-11-06 PROCEDURE — 82947 ASSAY GLUCOSE BLOOD QUANT: CPT | Performed by: PHYSICIAN ASSISTANT

## 2020-11-06 PROCEDURE — 80061 LIPID PANEL: CPT | Performed by: PHYSICIAN ASSISTANT

## 2020-11-06 RX ORDER — VENLAFAXINE HYDROCHLORIDE 37.5 MG/1
37.5 CAPSULE, EXTENDED RELEASE ORAL DAILY
Qty: 90 CAPSULE | Refills: 3 | Status: SHIPPED | OUTPATIENT
Start: 2020-11-06 | End: 2021-12-22

## 2020-11-06 RX ORDER — ALBUTEROL SULFATE 90 UG/1
AEROSOL, METERED RESPIRATORY (INHALATION)
Qty: 18 G | Refills: 3 | Status: SHIPPED | OUTPATIENT
Start: 2020-11-06 | End: 2021-08-26

## 2020-11-06 ASSESSMENT — ASTHMA QUESTIONNAIRES
QUESTION_1 LAST FOUR WEEKS HOW MUCH OF THE TIME DID YOUR ASTHMA KEEP YOU FROM GETTING AS MUCH DONE AT WORK, SCHOOL OR AT HOME: NONE OF THE TIME
QUESTION_2 LAST FOUR WEEKS HOW OFTEN HAVE YOU HAD SHORTNESS OF BREATH: ONCE A DAY
ACT_TOTALSCORE: 13
QUESTION_5 LAST FOUR WEEKS HOW WOULD YOU RATE YOUR ASTHMA CONTROL: WELL CONTROLLED
QUESTION_4 LAST FOUR WEEKS HOW OFTEN HAVE YOU USED YOUR RESCUE INHALER OR NEBULIZER MEDICATION (SUCH AS ALBUTEROL): THREE OR MORE TIMES PER DAY
QUESTION_3 LAST FOUR WEEKS HOW OFTEN DID YOUR ASTHMA SYMPTOMS (WHEEZING, COUGHING, SHORTNESS OF BREATH, CHEST TIGHTNESS OR PAIN) WAKE YOU UP AT NIGHT OR EARLIER THAN USUAL IN THE MORNING: FOUR OR MORE NIGHTS A WEEK

## 2020-11-06 ASSESSMENT — PATIENT HEALTH QUESTIONNAIRE - PHQ9
SUM OF ALL RESPONSES TO PHQ QUESTIONS 1-9: 7
5. POOR APPETITE OR OVEREATING: MORE THAN HALF THE DAYS

## 2020-11-06 ASSESSMENT — ANXIETY QUESTIONNAIRES
5. BEING SO RESTLESS THAT IT IS HARD TO SIT STILL: MORE THAN HALF THE DAYS
6. BECOMING EASILY ANNOYED OR IRRITABLE: MORE THAN HALF THE DAYS
IF YOU CHECKED OFF ANY PROBLEMS ON THIS QUESTIONNAIRE, HOW DIFFICULT HAVE THESE PROBLEMS MADE IT FOR YOU TO DO YOUR WORK, TAKE CARE OF THINGS AT HOME, OR GET ALONG WITH OTHER PEOPLE: SOMEWHAT DIFFICULT
3. WORRYING TOO MUCH ABOUT DIFFERENT THINGS: MORE THAN HALF THE DAYS
1. FEELING NERVOUS, ANXIOUS, OR ON EDGE: MORE THAN HALF THE DAYS
2. NOT BEING ABLE TO STOP OR CONTROL WORRYING: MORE THAN HALF THE DAYS
7. FEELING AFRAID AS IF SOMETHING AWFUL MIGHT HAPPEN: MORE THAN HALF THE DAYS
GAD7 TOTAL SCORE: 14

## 2020-11-06 ASSESSMENT — MIFFLIN-ST. JEOR: SCORE: 1231.34

## 2020-11-06 ASSESSMENT — PAIN SCALES - GENERAL: PAINLEVEL: NO PAIN (0)

## 2020-11-07 ASSESSMENT — ASTHMA QUESTIONNAIRES: ACT_TOTALSCORE: 13

## 2020-11-07 ASSESSMENT — ANXIETY QUESTIONNAIRES: GAD7 TOTAL SCORE: 14

## 2020-11-22 ENCOUNTER — HEALTH MAINTENANCE LETTER (OUTPATIENT)
Age: 45
End: 2020-11-22

## 2020-11-27 ENCOUNTER — TELEPHONE (OUTPATIENT)
Dept: FAMILY MEDICINE | Facility: OTHER | Age: 45
End: 2020-11-27

## 2020-11-27 NOTE — TELEPHONE ENCOUNTER
What is this form for? Can this wait until provider is in clinic on Monday?    Rosario Canseco PA-C

## 2020-12-01 NOTE — TELEPHONE ENCOUNTER
Please fill out NPI for provider.   Patient needs to sign before this can be submitted.     Rosario Canseco PA-C

## 2020-12-01 NOTE — TELEPHONE ENCOUNTER
Attempted to reach patient; form copied in placed in ES folder in pod #1. Original placed at  for patient to sign and submit herself or we can fax once signed.  Justyna Clayton MA

## 2020-12-01 NOTE — TELEPHONE ENCOUNTER
Patient returned call and given information from below. She will come in on December 2nd to sign the form and then would like the clinic to fax it for her.

## 2021-02-13 ENCOUNTER — HEALTH MAINTENANCE LETTER (OUTPATIENT)
Age: 46
End: 2021-02-13

## 2021-03-04 ENCOUNTER — OFFICE VISIT (OUTPATIENT)
Dept: FAMILY MEDICINE | Facility: OTHER | Age: 46
End: 2021-03-04
Payer: COMMERCIAL

## 2021-03-04 VITALS
DIASTOLIC BLOOD PRESSURE: 68 MMHG | OXYGEN SATURATION: 100 % | BODY MASS INDEX: 22.82 KG/M2 | SYSTOLIC BLOOD PRESSURE: 110 MMHG | HEIGHT: 65 IN | HEART RATE: 75 BPM | RESPIRATION RATE: 14 BRPM | WEIGHT: 137 LBS | TEMPERATURE: 98.3 F

## 2021-03-04 DIAGNOSIS — G47.9 SLEEP DISORDER: ICD-10-CM

## 2021-03-04 DIAGNOSIS — R53.83 MALAISE AND FATIGUE: ICD-10-CM

## 2021-03-04 DIAGNOSIS — F41.1 GENERALIZED ANXIETY DISORDER: ICD-10-CM

## 2021-03-04 DIAGNOSIS — R59.1 LYMPHADENOPATHY: Primary | ICD-10-CM

## 2021-03-04 DIAGNOSIS — R53.81 MALAISE AND FATIGUE: ICD-10-CM

## 2021-03-04 LAB
ALBUMIN SERPL-MCNC: 4 G/DL (ref 3.4–5)
ALP SERPL-CCNC: 61 U/L (ref 40–150)
ALT SERPL W P-5'-P-CCNC: 16 U/L (ref 0–50)
ANION GAP SERPL CALCULATED.3IONS-SCNC: 2 MMOL/L (ref 3–14)
AST SERPL W P-5'-P-CCNC: 5 U/L (ref 0–45)
BILIRUB SERPL-MCNC: 0.6 MG/DL (ref 0.2–1.3)
BUN SERPL-MCNC: 11 MG/DL (ref 7–30)
CALCIUM SERPL-MCNC: 8.9 MG/DL (ref 8.5–10.1)
CHLORIDE SERPL-SCNC: 107 MMOL/L (ref 94–109)
CO2 SERPL-SCNC: 29 MMOL/L (ref 20–32)
CREAT SERPL-MCNC: 0.67 MG/DL (ref 0.52–1.04)
DEPRECATED CALCIDIOL+CALCIFEROL SERPL-MC: 14 UG/L (ref 20–75)
ERYTHROCYTE [DISTWIDTH] IN BLOOD BY AUTOMATED COUNT: 12.8 % (ref 10–15)
GFR SERPL CREATININE-BSD FRML MDRD: >90 ML/MIN/{1.73_M2}
GLUCOSE SERPL-MCNC: 110 MG/DL (ref 70–99)
HCT VFR BLD AUTO: 40.6 % (ref 35–47)
HGB BLD-MCNC: 13.5 G/DL (ref 11.7–15.7)
MCH RBC QN AUTO: 29.7 PG (ref 26.5–33)
MCHC RBC AUTO-ENTMCNC: 33.3 G/DL (ref 31.5–36.5)
MCV RBC AUTO: 89 FL (ref 78–100)
PLATELET # BLD AUTO: 300 10E9/L (ref 150–450)
POTASSIUM SERPL-SCNC: 4 MMOL/L (ref 3.4–5.3)
PROT SERPL-MCNC: 7.5 G/DL (ref 6.8–8.8)
RBC # BLD AUTO: 4.55 10E12/L (ref 3.8–5.2)
SODIUM SERPL-SCNC: 138 MMOL/L (ref 133–144)
TSH SERPL DL<=0.005 MIU/L-ACNC: 1.48 MU/L (ref 0.4–4)
WBC # BLD AUTO: 5.6 10E9/L (ref 4–11)

## 2021-03-04 PROCEDURE — 80053 COMPREHEN METABOLIC PANEL: CPT | Performed by: PHYSICIAN ASSISTANT

## 2021-03-04 PROCEDURE — 36415 COLL VENOUS BLD VENIPUNCTURE: CPT | Performed by: PHYSICIAN ASSISTANT

## 2021-03-04 PROCEDURE — 82306 VITAMIN D 25 HYDROXY: CPT | Performed by: PHYSICIAN ASSISTANT

## 2021-03-04 PROCEDURE — 99214 OFFICE O/P EST MOD 30 MIN: CPT | Performed by: PHYSICIAN ASSISTANT

## 2021-03-04 PROCEDURE — 85027 COMPLETE CBC AUTOMATED: CPT | Performed by: PHYSICIAN ASSISTANT

## 2021-03-04 PROCEDURE — 84443 ASSAY THYROID STIM HORMONE: CPT | Performed by: PHYSICIAN ASSISTANT

## 2021-03-04 ASSESSMENT — MIFFLIN-ST. JEOR: SCORE: 1258.56

## 2021-03-04 ASSESSMENT — ANXIETY QUESTIONNAIRES
2. NOT BEING ABLE TO STOP OR CONTROL WORRYING: SEVERAL DAYS
6. BECOMING EASILY ANNOYED OR IRRITABLE: SEVERAL DAYS
3. WORRYING TOO MUCH ABOUT DIFFERENT THINGS: SEVERAL DAYS
4. TROUBLE RELAXING: NEARLY EVERY DAY
GAD7 TOTAL SCORE: 10
GAD7 TOTAL SCORE: 10
5. BEING SO RESTLESS THAT IT IS HARD TO SIT STILL: SEVERAL DAYS
1. FEELING NERVOUS, ANXIOUS, OR ON EDGE: NEARLY EVERY DAY
GAD7 TOTAL SCORE: 10
7. FEELING AFRAID AS IF SOMETHING AWFUL MIGHT HAPPEN: NOT AT ALL
7. FEELING AFRAID AS IF SOMETHING AWFUL MIGHT HAPPEN: NOT AT ALL

## 2021-03-04 ASSESSMENT — PATIENT HEALTH QUESTIONNAIRE - PHQ9
10. IF YOU CHECKED OFF ANY PROBLEMS, HOW DIFFICULT HAVE THESE PROBLEMS MADE IT FOR YOU TO DO YOUR WORK, TAKE CARE OF THINGS AT HOME, OR GET ALONG WITH OTHER PEOPLE: SOMEWHAT DIFFICULT
SUM OF ALL RESPONSES TO PHQ QUESTIONS 1-9: 6
SUM OF ALL RESPONSES TO PHQ QUESTIONS 1-9: 6

## 2021-03-04 NOTE — PROGRESS NOTES
Assessment & Plan     Lymphadenopathy  Malaise and fatigue  Sleep disorder  Generalized anxiety disorder  I cannot identify the distinct lump that she states that she has had to the right neck posterior chain of the lymph nodes at the base of the neck today.  Advised that she monitor this carefully and present to the clinic should they return.  She does have a lymphadenopathy to the mandibular region on the right today.  We discussed that the best way to look at this in the future should we be able to identify the lump she has been feeling outpatient would be to located with surgical marking pen to the external surface and then proceed with ultrasound.  We discussed getting ultrasound done versus CT scan of the neck today and elected to wait until we can identify this more completely.  Patient states she has had a family history of thyroid related issues and vitamin D deficiency.  We recognize that a lot of what she is going through could be stress related to not being able to sleep well too.  She is off multiple times a night to urinate.  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  She drinks about a gallon of water per day.  I suspect that this may be stress related as well.  Further evaluation based on findings of sleep study and labs coming up.  Advised that she discipline herself to urinate during the daytime as well if at all possible.  - TSH with free T4 reflex  - Comprehensive metabolic panel  - CBC with platelets  - Vitamin D Deficiency  - SLEEP EVALUATION & MANAGEMENT REFERRAL - [facility] [phone] (Age 13 and up if over 100 lbs); Future             Regular exercise    Return in about 4 weeks (around 4/1/2021) for recheck of current condition, if symptoms do not improve.    ALBERTO Oates [facility]    Subjective     Carolina Celeste is a 45 year old female  "who presents to clinic today for the following health issues:    Mass    History of Present Illness       She eats 0-1 servings of fruits and vegetables daily.She consumes 0 sweetened beverage(s) daily.She exercises with enough effort to increase her heart rate 20 to 29 minutes per day.  She exercises with enough effort to increase her heart rate 4 days per week. She is missing 1 dose(s) of medications per week.       Patient here for lump on neck. Its been there for since Nov. 2020. No pain when you touch. Hard and doesn't move when you touch it.     Review of Systems   Constitutional, HEENT, cardiovascular, pulmonary, GI, , musculoskeletal, neuro, skin, endocrine and psych systems are negative, except as otherwise noted.      Objective    /68   Pulse 75   Temp 98.3  F (36.8  C) (Temporal)   Resp 14   Ht 1.645 m (5' 4.76\")   Wt 62.1 kg (137 lb)   LMP 03/07/2009   SpO2 100%   BMI 22.96 kg/m    Body mass index is 22.96 kg/m .  Physical Exam   GENERAL: healthy, alert and no distress  HENT: ear canals and TM's normal, nose and mouth without ulcers or lesions  NECK: cervical adenopathy right mandibular region noted but I do not feel anything at the site where she has been feeling the lump in the past., no asymmetry, masses, or scars, thyroid normal to palpation and trachea midline and normal to palpation  RESP: lungs clear to auscultation - no rales, rhonchi or wheezes  CV: regular rate and rhythm, normal S1 S2, no S3 or S4, no murmur, click or rub, no peripheral edema and peripheral pulses strong  ABDOMEN: soft, nontender, no hepatosplenomegaly, no masses and bowel sounds normal  MS: no gross musculoskeletal defects noted, no edema  SKIN: no suspicious lesions or rashes to visible skin today.  NEURO: Normal strength and tone, mentation intact and speech normal  PSYCH: mentation appears normal, affect normal/bright    No results found for this or any previous visit (from the past 24 hour(s)).    "   Answers for HPI/ROS submitted by the patient on 3/4/2021   Chronic problems general questions HPI Form  If you checked off any problems, how difficult have these problems made it for you to do your work, take care of things at home, or get along with other people?: Somewhat difficult  PHQ9 TOTAL SCORE: 6  SERGE 7 TOTAL SCORE: 10

## 2021-03-05 ENCOUNTER — TELEPHONE (OUTPATIENT)
Dept: FAMILY MEDICINE | Facility: OTHER | Age: 46
End: 2021-03-05

## 2021-03-05 DIAGNOSIS — E55.9 VITAMIN D DEFICIENCY: Primary | ICD-10-CM

## 2021-03-05 RX ORDER — ERGOCALCIFEROL 1.25 MG/1
50000 CAPSULE, LIQUID FILLED ORAL WEEKLY
Qty: 13 CAPSULE | Refills: 3 | Status: SHIPPED | OUTPATIENT
Start: 2021-03-05 | End: 2021-12-22

## 2021-03-05 ASSESSMENT — ANXIETY QUESTIONNAIRES: GAD7 TOTAL SCORE: 10

## 2021-03-05 ASSESSMENT — PATIENT HEALTH QUESTIONNAIRE - PHQ9: SUM OF ALL RESPONSES TO PHQ QUESTIONS 1-9: 6

## 2021-03-05 NOTE — TELEPHONE ENCOUNTER
Reason for Call:  Other prescription    Detailed comments: Pt calling for she received a  [employer] message from Dmitriy Burgos asking her how she wished to proceed on where to send her requested medications to.  However, since it was a  automated message Pt could not respond back to [employer] message and she is wondering how she can go about proceeding to ensure her requested Rx's are sent.    Phone Number Patient can be reached at: Home number on file [phone] (home)    Best Time: anytime    Can we leave a detailed message on this number? YES    Call taken on 3/5/2021 at 8:42 AM by Jared Riley

## 2021-03-18 ENCOUNTER — TELEPHONE (OUTPATIENT)
Dept: FAMILY MEDICINE | Facility: OTHER | Age: 46
End: 2021-03-18

## 2021-03-18 ENCOUNTER — MYC MEDICAL ADVICE (OUTPATIENT)
Dept: FAMILY MEDICINE | Facility: OTHER | Age: 46
End: 2021-03-18

## 2021-03-18 NOTE — LETTER
[facility]  [address]  [place] [address]  Phone: [phone]  April 20, 2021      Carolina Celeste  [id number] [address] [place] [id number]      Dear Carolina,    We care about your health and have reviewed your health plan including your medical conditions, medications, and lab results.  Based on this review, it is recommended that you follow up regarding the following health topic(s):  -Asthma  -Breast Cancer Screening    We recommend you take the following action(s):  -schedule a MAMMOGRAM which is due. Please disregard this reminder if you have had this exam elsewhere within the last 1-2 years please let us know so we can update your records.   -Complete and return the attached ASTHMA CONTROL TEST.  If your total score is 19 or less or you have been to the ER or urgent care for your asthma, then please schedule an asthma followup appointment.     Please call us at the [facility] - [phone] (or use [employer]) to address the above recommendations.     Thank you for trusting [facility] and we appreciate the opportunity to serve you.  We look forward to supporting your healthcare needs in the future.    Healthy Regards,    Your Health Care Team  [facility]

## 2021-03-18 NOTE — TELEPHONE ENCOUNTER
Patient Quality Outreach Summary      Summary:    Patient is due/failing the following:   ACT needed    Type of outreach:    Sent [employer] message.    Questions for provider review:    None                                                                                                                    Afsaneh Gudino CMA       Chart routed to Care Team.

## 2021-04-20 NOTE — TELEPHONE ENCOUNTER
2nd attempt to contact patient. Unable to leave a message and reminder letter sent.   Afsaneh Mello

## 2021-08-25 DIAGNOSIS — J45.20 INTERMITTENT ASTHMA, UNCOMPLICATED: ICD-10-CM

## 2021-08-26 RX ORDER — ALBUTEROL SULFATE 90 UG/1
AEROSOL, METERED RESPIRATORY (INHALATION)
Qty: 18 G | Refills: 1 | Status: SHIPPED | OUTPATIENT
Start: 2021-08-26 | End: 2021-12-22

## 2021-08-26 NOTE — TELEPHONE ENCOUNTER
Prescription approved per [facility] Refill Protocol.    Lucia Giron RN on 8/26/2021 at 4:15 PM

## 2021-09-19 ENCOUNTER — HEALTH MAINTENANCE LETTER (OUTPATIENT)
Age: 46
End: 2021-09-19

## 2021-12-02 ENCOUNTER — HOSPITAL ENCOUNTER (OUTPATIENT)
Dept: MAMMOGRAPHY | Facility: CLINIC | Age: 46
Discharge: HOME | End: 2021-12-02
Attending: PHYSICIAN ASSISTANT | Admitting: NURSE PRACTITIONER
Payer: COMMERCIAL

## 2021-12-02 DIAGNOSIS — Z12.31 VISIT FOR SCREENING MAMMOGRAM: ICD-10-CM

## 2021-12-02 PROCEDURE — 77067 SCR MAMMO BI INCL CAD: CPT

## 2021-12-22 ENCOUNTER — OFFICE VISIT (OUTPATIENT)
Dept: FAMILY MEDICINE | Facility: CLINIC | Age: 46
End: 2021-12-22
Payer: COMMERCIAL

## 2021-12-22 VITALS
TEMPERATURE: 98.7 F | WEIGHT: 138 LBS | OXYGEN SATURATION: 98 % | HEIGHT: 65 IN | SYSTOLIC BLOOD PRESSURE: 116 MMHG | HEART RATE: 96 BPM | DIASTOLIC BLOOD PRESSURE: 68 MMHG | BODY MASS INDEX: 22.99 KG/M2

## 2021-12-22 DIAGNOSIS — F41.1 GENERALIZED ANXIETY DISORDER: ICD-10-CM

## 2021-12-22 DIAGNOSIS — E55.9 VITAMIN D DEFICIENCY: ICD-10-CM

## 2021-12-22 DIAGNOSIS — Z00.00 ROUTINE GENERAL MEDICAL EXAMINATION AT A HEALTH CARE FACILITY: Primary | ICD-10-CM

## 2021-12-22 DIAGNOSIS — J45.20 INTERMITTENT ASTHMA, UNCOMPLICATED: ICD-10-CM

## 2021-12-22 PROCEDURE — 99213 OFFICE O/P EST LOW 20 MIN: CPT | Mod: 25 | Performed by: NURSE PRACTITIONER

## 2021-12-22 PROCEDURE — 99396 PREV VISIT EST AGE 40-64: CPT | Performed by: NURSE PRACTITIONER

## 2021-12-22 RX ORDER — ALBUTEROL SULFATE 90 UG/1
AEROSOL, METERED RESPIRATORY (INHALATION)
Qty: 18 G | Refills: 11 | Status: SHIPPED | OUTPATIENT
Start: 2021-12-22

## 2021-12-22 RX ORDER — SERTRALINE HYDROCHLORIDE 25 MG/1
25 TABLET, FILM COATED ORAL DAILY
Qty: 90 TABLET | Refills: 0 | Status: SHIPPED | OUTPATIENT
Start: 2021-12-22 | End: 2022-04-13

## 2021-12-22 RX ORDER — ALBUTEROL SULFATE 90 UG/1
AEROSOL, METERED RESPIRATORY (INHALATION)
Qty: 18 G | Refills: 1 | Status: SHIPPED | OUTPATIENT
Start: 2021-12-22 | End: 2021-12-22

## 2021-12-22 ASSESSMENT — ENCOUNTER SYMPTOMS
SHORTNESS OF BREATH: 0
ARTHRALGIAS: 1
CONSTIPATION: 0
FEVER: 0
PALPITATIONS: 0
DIZZINESS: 0
HEARTBURN: 0
FREQUENCY: 1
EYE PAIN: 0
NAUSEA: 0
DIARRHEA: 0
HEMATURIA: 0
PARESTHESIAS: 0
SORE THROAT: 0
CHILLS: 0
COUGH: 0
HEADACHES: 0
ABDOMINAL PAIN: 0
WEAKNESS: 0
MYALGIAS: 0
JOINT SWELLING: 0
DYSURIA: 0
HEMATOCHEZIA: 0
NERVOUS/ANXIOUS: 1
BREAST MASS: 0

## 2021-12-22 ASSESSMENT — PAIN SCALES - GENERAL: PAINLEVEL: NO PAIN (0)

## 2021-12-22 ASSESSMENT — ASTHMA QUESTIONNAIRES
QUESTION_3 LAST FOUR WEEKS HOW OFTEN DID YOUR ASTHMA SYMPTOMS (WHEEZING, COUGHING, SHORTNESS OF BREATH, CHEST TIGHTNESS OR PAIN) WAKE YOU UP AT NIGHT OR EARLIER THAN USUAL IN THE MORNING: FOUR OR MORE NIGHTS A WEEK
QUESTION_1 LAST FOUR WEEKS HOW MUCH OF THE TIME DID YOUR ASTHMA KEEP YOU FROM GETTING AS MUCH DONE AT WORK, SCHOOL OR AT HOME: NONE OF THE TIME
QUESTION_5 LAST FOUR WEEKS HOW WOULD YOU RATE YOUR ASTHMA CONTROL: WELL CONTROLLED
QUESTION_2 LAST FOUR WEEKS HOW OFTEN HAVE YOU HAD SHORTNESS OF BREATH: THREE TO SIX TIMES A WEEK
QUESTION_4 LAST FOUR WEEKS HOW OFTEN HAVE YOU USED YOUR RESCUE INHALER OR NEBULIZER MEDICATION (SUCH AS ALBUTEROL): ONE OR TWO TIMES PER DAY
ACT_TOTALSCORE: 15

## 2021-12-22 ASSESSMENT — MIFFLIN-ST. JEOR: SCORE: 1261.84

## 2021-12-22 NOTE — PROGRESS NOTES
SUBJECTIVE:   CC: Carolina Celeste is an 46 year old woman who presents for preventive health visit.   Patient has been advised of split billing requirements and indicates understanding: Yes  Healthy Habits:     Getting at least 3 servings of Calcium per day:  Yes    Bi-annual eye exam:  NO    Dental care twice a year:  NO    Sleep apnea or symptoms of sleep apnea:  None    Diet:  Low fat/cholesterol, Carbohydrate counting and Breakfast skipped    Frequency of exercise:  2-3 days/week    Duration of exercise:  15-30 minutes    Taking medications regularly:  No    Barriers to taking medications:  Side effects    Medication side effects:  Other    PHQ-2 Total Score: 2    Additional concerns today:  No    PROBLEMS TO ADD ON... Patient states she quit taking her Effexor approximately 4 months ago feels that her mood has worsened with anxiety.  She would like to discussed an alternative to the Effexor as she felt that this was affecting weight gain.    Today's PHQ-2 Score:   PHQ-2 (  [employer]) 2021   Q1: Little interest or pleasure in doing things 1   Q2: Feeling down, depressed or hopeless 1   PHQ-2 Score 2   PHQ-2 Total Score (12-17 Years)- Positive if 3 or more points; Administer PHQ-A if positive -   Q1: Little interest or pleasure in doing things Several days   Q2: Feeling down, depressed or hopeless Several days   PHQ-2 Score 2       Abuse: Current or Past (Physical, Sexual or Emotional) - No  Do you feel safe in your environment? Yes    Have you ever done Advance Care Planning? (For example, a Health Directive, POLST, or a discussion with a medical provider or your loved ones about your wishes): No, advance care planning information given to patient to review.  Patient declined advance care planning discussion at this time.    Social History     Tobacco Use     Smoking status: Former Smoker     Packs/day: 0.10     Years: 4.00     Pack years: 0.40     Quit date: 2011     Years since quittin.9      Smokeless tobacco: Never Used     Tobacco comment: Quit    Substance Use Topics     Alcohol use: Not Currently     Alcohol/week: 0.0 standard drinks     Comment: very little         Alcohol Use 2021   Prescreen: >3 drinks/day or >7 drinks/week? No   Prescreen: >3 drinks/day or >7 drinks/week? -       Reviewed orders with patient.  Reviewed health maintenance and updated orders accordingly - Yes  Labs reviewed in EPIC  BP Readings from Last 3 Encounters:   21 116/68   21 110/68   20 110/80    Wt Readings from Last 3 Encounters:   21 62.6 kg (138 lb)   21 62.1 kg (137 lb)   20 59.4 kg (131 lb)                  Patient Active Problem List   Diagnosis     Allergic rhinitis     Generalized anxiety disorder     Intermittent asthma, uncomplicated     Immunization not carried out because of patient refusal     History of hysterectomy leaving cervix intact     History of tobacco use, presenting hazards to health     Perimenopausal symptoms     Insomnia, unspecified type     Sleep disorder     Malaise and fatigue     Lymphadenopathy     Past Surgical History:   Procedure Laterality Date     LAPAROSCOPY,VAG HYSTERECTOMY  2009    supracervical hyst.       Social History     Tobacco Use     Smoking status: Former Smoker     Packs/day: 0.10     Years: 4.00     Pack years: 0.40     Quit date: 2011     Years since quittin.9     Smokeless tobacco: Never Used     Tobacco comment: Quit    Substance Use Topics     Alcohol use: Not Currently     Alcohol/week: 0.0 standard drinks     Comment: very little     Family History   Problem Relation Age of Onset     Lipids Father      Hypertension Father      Psychotic Disorder Father         [place]     Neurologic Disorder Maternal Grandmother         Parkinsons     Depression Maternal Grandmother      Hypertension Maternal Grandmother      Thyroid Disease Maternal Grandmother      Alzheimer Disease Maternal Grandfather       Genitourinary Problems Paternal Grandfather         Cirrohosis of LIver     Breast Cancer Other      Cancer Maternal Uncle         Stomach/Esophagus     Breast Cancer Maternal Aunt      Cancer Maternal Uncle         stomach     Cancer - colorectal No family hx of          Current Outpatient Medications   Medication Sig Dispense Refill     albuterol (VENTOLIN HFA) 108 (90 Base) MCG/ACT inhaler INHALE 2 PUFFS BY MOUTH EVERY 4 HOURS AS NEEDED FOR SHORTNESS OF BREATH 18 g 1     Allergies   Allergen Reactions     Sunflower Seed [Sunflower Oil]      Throat itching, wheezing     Latex      Recent Labs   Lab Test 03/04/21  1108 11/06/20  1047 11/27/19  0919 11/02/18  0902   LDL  --  122* 126* 116*   HDL  --  79 79 66   TRIG  --  75 65 58   ALT 16  --   --   --    CR 0.67  --   --  0.77   GFRESTIMATED >90  --   --  81   GFRESTBLACK >90  --   --  >90   POTASSIUM 4.0  --   --  4.0   TSH 1.48  --   --  1.41        Breast Cancer Screening:    FHS-7:   Breast CA Risk Assessment (FHS-7) 11/25/2021 11/25/2021 12/2/2021 12/22/2021   Did any of your first-degree relatives have breast or ovarian cancer? Yes Yes No Yes   Did any of your relatives have bilateral breast cancer? No No No No   Did any man in your family have breast cancer? No No No No   Did any woman in your family have breast and ovarian cancer? Yes Yes No Yes   Did any woman in your family have breast cancer before age 50 y? No No No No   Do you have 2 or more relatives with breast and/or ovarian cancer? No No No No   Do you have 2 or more relatives with breast and/or bowel cancer? No No No No       Mammogram Screening: Recommended annual mammography  Pertinent mammograms are reviewed under the imaging tab.    History of abnormal Pap smear: NO - age 30-65 PAP every 5 years with negative HPV co-testing recommended  PAP / HPV Latest Ref Rng & Units 11/29/2017 9/9/2016 1/13/2014   PAP (Historical) - NIL NIL NIL   HPV16 NEG:Negative Negative Negative -   HPV18 NEG:Negative  "Negative Negative -   HRHPV NEG:Negative Negative Negative -     Reviewed and updated as needed this visit by clinical staff  Tobacco  Allergies  Meds   Med Hx  Surg Hx  Fam Hx  Soc Hx       Reviewed and updated as needed this visit by Provider               Past Medical History:   Diagnosis Date     Allergic rhinitis, cause unspecified 2006     Dysmenorrhea      Mild intermittent asthma 2006      Past Surgical History:   Procedure Laterality Date     LAPAROSCOPY,VAG HYSTERECTOMY  2009    supracervical hyst.     OB History    Para Term  AB Living   2 2 0 2 0 2   SAB IAB Ectopic Multiple Live Births   0 0 0 0 2      # Outcome Date GA Lbr Yandel/2nd Weight Sex Delivery Anes PTL Lv   2  10/31/98 34w0d  2.693 kg (5 lb 15 oz) F    DALIA      Name: Nick   1  97 34w0d  2.693 kg (5 lb 15 oz) M    DALIA      Name: Reji       Review of Systems   Constitutional: Negative for chills and fever.   HENT: Negative for congestion, ear pain, hearing loss and sore throat.    Eyes: Positive for visual disturbance. Negative for pain.   Respiratory: Negative for cough and shortness of breath.    Cardiovascular: Negative for chest pain, palpitations and peripheral edema.   Gastrointestinal: Negative for abdominal pain, constipation, diarrhea, heartburn, hematochezia and nausea.   Breasts:  Negative for tenderness, breast mass and discharge.   Genitourinary: Positive for frequency. Negative for dysuria, genital sores, hematuria, pelvic pain, urgency, vaginal bleeding and vaginal discharge.   Musculoskeletal: Positive for arthralgias. Negative for joint swelling and myalgias.   Skin: Negative for rash.   Neurological: Negative for dizziness, weakness, headaches and paresthesias.   Psychiatric/Behavioral: Positive for mood changes. The patient is nervous/anxious.           OBJECTIVE:   /68   Pulse 96   Temp 98.7  F (37.1  C) (Temporal)   Ht 1.651 m (5' 5\")   Wt 62.6 kg (138 lb)   " "LMP 03/07/2009   SpO2 98%   BMI 22.96 kg/m    Physical Exam  GENERAL: healthy, alert and no distress  EYES: Eyes grossly normal to inspection, PERRL and conjunctivae and sclerae normal  HENT: ear canals and TM's normal, nose and mouth without ulcers or lesions  NECK: no adenopathy, no asymmetry, masses, or scars and thyroid normal to palpation  RESP: lungs clear to auscultation - no rales, rhonchi or wheezes  CV: regular rate and rhythm, normal S1 S2, no S3 or S4, no murmur, click or rub, no peripheral edema and peripheral pulses strong  ABDOMEN: soft, nontender, no hepatosplenomegaly, no masses and bowel sounds normal  MS: no gross musculoskeletal defects noted, no edema  SKIN: no suspicious lesions or rashes  NEURO: Normal strength and tone, mentation intact and speech normal  PSYCH: mentation appears normal, affect normal/bright        ASSESSMENT/PLAN:       ICD-10-CM    1. Routine general medical examination at a health care facility  Z00.00 Lipid panel reflex to direct LDL Fasting     Basic metabolic panel  (Ca, Cl, CO2, Creat, Gluc, K, Na, BUN)   2. Intermittent asthma, uncomplicated  J45.20 albuterol (VENTOLIN HFA) 108 (90 Base) MCG/ACT inhaler     DISCONTINUED: albuterol (VENTOLIN HFA) 108 (90 Base) MCG/ACT inhaler   3. Generalized anxiety disorder  F41.1 sertraline (ZOLOFT) 25 MG tablet   4. Vitamin D deficiency  E55.9 Vitamin D Deficiency       Patient has been advised of split billing requirements and indicates understanding: Yes  COUNSELING:  Reviewed preventive health counseling, as reflected in patient instructions       Regular exercise       Healthy diet/nutrition       Vision screening       Immunizations    Declined: Influenza due to Other                Osteoporosis prevention/bone health    Estimated body mass index is 22.96 kg/m  as calculated from the following:    Height as of this encounter: 1.651 m (5' 5\").    Weight as of this encounter: 62.6 kg (138 lb).        She reports that she quit " smoking about 9 years ago. She has a 0.40 pack-year smoking history. She has never used smokeless tobacco.      Counseling Resources:  ATP IV Guidelines  Pooled Cohorts Equation Calculator  Breast Cancer Risk Calculator  BRCA-Related Cancer Risk Assessment: FHS-7 Tool  FRAX Risk Assessment  ICSI Preventive Guidelines  Dietary Guidelines for Americans, 2010  USDA's MyPlate  ASA Prophylaxis  Lung CA Screening    JOEL Roca [facility]

## 2021-12-22 NOTE — LETTER
My Asthma Action Plan    Name: Carolina Celeste   YOB: 1975  Date: 12/22/2021   My doctor: JOEL Roca CNP   My clinic: [facility]        My Rescue Medicine:   Albuterol inhaler (Proair/Ventolin/Proventil HFA)  2-4 puffs EVERY 4 HOURS as needed. Use a spacer if recommended by your provider.   My Asthma Severity:   Intermittent / Exercise Induced  Know your asthma triggers: exercise or sports  pollens          GREEN ZONE   Good Control    I feel good    No cough or wheeze    Can work, sleep and play without asthma symptoms       Take your asthma control medicine every day.     1. If exercise triggers your asthma, take your rescue medication    15 minutes before exercise or sports, and    During exercise if you have asthma symptoms  2. Spacer to use with inhaler: If you have a spacer, make sure to use it with your inhaler             YELLOW ZONE Getting Worse  I have ANY of these:    I do not feel good    Cough or wheeze    Chest feels tight    Wake up at night   1. Keep taking your Green Zone medications  2. Start taking your rescue medicine:    every 20 minutes for up to 1 hour. Then every 4 hours for 24-48 hours.  3. If you stay in the Yellow Zone for more than 12-24 hours, contact your doctor.  4. If you do not return to the Green Zone in 12-24 hours or you get worse, start taking your oral steroid medicine if prescribed by your provider.           RED ZONE Medical Alert - Get Help  I have ANY of these:    I feel awful    Medicine is not helping    Breathing getting harder    Trouble walking or talking    Nose opens wide to breathe       1. Take your rescue medicine NOW  2. If your provider has prescribed an oral steroid medicine, start taking it NOW  3. Call your doctor NOW  4. If you are still in the Red Zone after 20 minutes and you have not reached your doctor:    Take your rescue medicine again and    Call 911 or go to the emergency room right away    See  your regular doctor within 2 weeks of an Emergency Room or Urgent Care visit for follow-up treatment.          Annual Reminders:  Meet with Asthma Educator,  Flu Shot in the Fall, consider Pneumonia Vaccination for patients with asthma (aged 19 and older).    Pharmacy:    [facility], [place] - [address] [facility] [place], [place] - [address] [facility] - [place], [place] - [address] [place] [address] [facility]ARMACY [address] [place], [place] - [address] [address] [facility]  [id number] - [place], [place] - [address] [facility]    Electronically signed by JOEL Roca CNP   Date: 12/22/21                    Asthma Triggers  How To Control Things That Make Your Asthma Worse    Triggers are things that make your asthma worse.  Look at the list below to help you find your triggers and   what you can do about them. You can help prevent asthma flare-ups by staying away from your triggers.      Trigger                                                          What you can do   Cigarette Smoke  Tobacco smoke can make asthma worse. Do not allow smoking in your home, car or around you.  Be sure no one smokes at a child s day care or school.  If you smoke, ask your health care provider for ways to help you quit.  Ask family members to quit too.  Ask your health care provider for a referral to Quit Plan to help you quit smoking, or call 3-744-514-PLAN.     Colds, Flu, Bronchitis  These are common triggers of asthma. Wash your hands often.  Don t touch your eyes, nose or mouth.  Get a flu shot every year.     Dust Mites  These are tiny bugs that live in cloth or carpet. They are too small to see. Wash sheets and blankets in hot water every week.   Encase pillows and mattress in dust mite proof covers.  Avoid having carpet if you can. If you have carpet, vacuum weekly.   Use a dust mask and HEPA vacuum.   Pollen and Outdoor Mold  Some people are allergic to trees, grass, or weed pollen, or  molds. Try to keep your windows closed.  Limit time out doors when pollen count is high.   Ask you health care provider about taking medicine during allergy season.     Animal Dander  Some people are allergic to skin flakes, urine or saliva from pets with fur or feathers. Keep pets with fur or feathers out of your home.    If you can t keep the pet outdoors, then keep the pet out of your bedroom.  Keep the bedroom door closed.  Keep pets off cloth furniture and away from stuffed toys.     Mice, Rats, and Cockroaches  Some people are allergic to the waste from these pests.   Cover food and garbage.  Clean up spills and food crumbs.  Store grease in the refrigerator.   Keep food out of the bedroom.   Indoor Mold  This can be a trigger if your home has high moisture. Fix leaking faucets, pipes, or other sources of water.   Clean moldy surfaces.  Dehumidify basement if it is damp and smelly.   Smoke, Strong Odors, and Sprays  These can reduce air quality. Stay away from strong odors and sprays, such as perfume, powder, hair spray, paints, smoke incense, paint, cleaning products, candles and new carpet.   Exercise or Sports  Some people with asthma have this trigger. Be active!  Ask your doctor about taking medicine before sports or exercise to prevent symptoms.    Warm up for 5-10 minutes before and after sports or exercise.     Other Triggers of Asthma  Cold air:  Cover your nose and mouth with a scarf.  Sometimes laughing or crying can be a trigger.  Some medicines and food can trigger asthma.

## 2021-12-22 NOTE — PATIENT INSTRUCTIONS
Preventive Health Recommendations  Female Ages 40 to 49    Yearly exam:     See your health care provider every year in order to  1. Review health changes.   2. Discuss preventive care.    3. Review your medicines if your doctor prescribed any.      Get a Pap test every three years (unless you have an abnormal result and your provider advises testing more often).      If you get Pap tests with HPV test, you only need to test every 5 years, unless you have an abnormal result. You do not need a Pap test if your uterus was removed (hysterectomy) and you have not had cancer.      You should be tested each year for STDs (sexually transmitted diseases), if you're at risk.     Ask your doctor if you should have a mammogram.      Have a colonoscopy (test for colon cancer) if someone in your family has had colon cancer or polyps before age 50.       Have a cholesterol test every 5 years.       Have a diabetes test (fasting glucose) after age 45. If you are at risk for diabetes, you should have this test every 3 years.    Shots: Get a flu shot each year. Get a tetanus shot every 10 years.     Nutrition:     Eat at least 5 servings of fruits and vegetables each day.    Eat whole-grain bread, whole-wheat pasta and brown rice instead of white grains and rice.    Get adequate Calcium and Vitamin D.      Lifestyle    Exercise at least 150 minutes a week (an average of 30 minutes a day, 5 days a week). This will help you control your weight and prevent disease.    Limit alcohol to one drink per day.    No smoking.     Wear sunscreen to prevent skin cancer.    See your dentist every six months for an exam and cleaning.    Please follow up in 6 weeks for mood.   If doing ok may have virtual visit

## 2021-12-23 ASSESSMENT — ASTHMA QUESTIONNAIRES: ACT_TOTALSCORE: 15

## 2022-03-03 ENCOUNTER — HOSPITAL ENCOUNTER (EMERGENCY)
Facility: CLINIC | Age: 47
Discharge: HOME | End: 2022-03-03
Attending: EMERGENCY MEDICINE | Admitting: EMERGENCY MEDICINE
Payer: COMMERCIAL

## 2022-03-03 VITALS
OXYGEN SATURATION: 100 % | DIASTOLIC BLOOD PRESSURE: 48 MMHG | SYSTOLIC BLOOD PRESSURE: 104 MMHG | TEMPERATURE: 97 F | HEART RATE: 72 BPM | RESPIRATION RATE: 14 BRPM

## 2022-03-03 DIAGNOSIS — S61.412A LACERATION OF LEFT HAND WITHOUT FOREIGN BODY, INITIAL ENCOUNTER: ICD-10-CM

## 2022-03-03 PROCEDURE — 99284 EMERGENCY DEPT VISIT MOD MDM: CPT | Mod: 25

## 2022-03-03 PROCEDURE — 12002 RPR S/N/AX/GEN/TRNK2.6-7.5CM: CPT

## 2022-03-03 PROCEDURE — 12002 RPR S/N/AX/GEN/TRNK2.6-7.5CM: CPT | Performed by: EMERGENCY MEDICINE

## 2022-03-03 PROCEDURE — 250N000009 HC RX 250: Performed by: EMERGENCY MEDICINE

## 2022-03-03 PROCEDURE — 99284 EMERGENCY DEPT VISIT MOD MDM: CPT | Mod: 25 | Performed by: EMERGENCY MEDICINE

## 2022-03-03 RX ORDER — METHYLCELLULOSE 4000CPS 30 %
POWDER (GRAM) MISCELLANEOUS ONCE
Status: DISCONTINUED | OUTPATIENT
Start: 2022-03-03 | End: 2022-03-03 | Stop reason: CLARIF

## 2022-03-03 RX ORDER — OXYCODONE AND ACETAMINOPHEN 5; 325 MG/1; MG/1
1 TABLET ORAL EVERY 6 HOURS PRN
Qty: 12 TABLET | Refills: 0 | Status: SHIPPED | OUTPATIENT
Start: 2022-03-03 | End: 2022-03-06

## 2022-03-03 RX ADMIN — Medication 3 ML: at 08:55

## 2022-03-03 NOTE — ED PROVIDER NOTES
History     Chief Complaint   Patient presents with     Laceration     left hand     HPI  Carolina Celeste is a 46 year old female who presents with a laceration to her left nondominant hand.  Patient states she was trying to close the garage door and a piece of plastic with a laceration to the webspace and into the palm between her index and middle finger.  Adequate hemostasis with pressure.  Denies any paresthesias after the injury.  Able to move the digits without difficulty.  Tetanus is updated in 2018.  No other injury with the incident.    Allergies:  Allergies   Allergen Reactions     Sunflower Seed [Sunflower Oil]      Throat itching, wheezing     Latex        Problem List:    Patient Active Problem List    Diagnosis Date Noted     Sleep disorder 03/04/2021     Priority: Medium     Malaise and fatigue 03/04/2021     Priority: Medium     Lymphadenopathy 03/04/2021     Priority: Medium     Perimenopausal symptoms 11/27/2019     Priority: Medium     Insomnia, unspecified type 11/27/2019     Priority: Medium     History of tobacco use, presenting hazards to health 11/03/2018     Priority: Medium     History of hysterectomy leaving cervix intact 11/29/2017     Priority: Medium     Immunization not carried out because of patient refusal 04/30/2014     Priority: Medium     Intermittent asthma, uncomplicated 01/14/2014     Priority: Medium     Generalized anxiety disorder 01/13/2014     Priority: Medium     Diagnosis updated by automated process. Provider to review and confirm.       Allergic rhinitis 09/07/2006     Priority: Medium     Problem list name updated by automated process. Provider to review          Past Medical History:    Past Medical History:   Diagnosis Date     Allergic rhinitis, cause unspecified 9/7/2006     Anxiety      Dysmenorrhea      Mild intermittent asthma 9/7/2006       Past Surgical History:    Past Surgical History:   Procedure Laterality Date     LAPAROSCOPY,VAG HYSTERECTOMY  4/2009     supracervical hyst.       Family History:    Family History   Problem Relation Age of Onset     Lipids Father      Hypertension Father      Psychotic Disorder Father         [place]     Neurologic Disorder Maternal Grandmother         Parkinsons     Depression Maternal Grandmother      Hypertension Maternal Grandmother      Thyroid Disease Maternal Grandmother      Alzheimer Disease Maternal Grandfather      Genitourinary Problems Paternal Grandfather         Cirrohosis of LIver     Breast Cancer Other      Cancer Maternal Uncle         Stomach/Esophagus     Breast Cancer Maternal Aunt      Cancer Maternal Uncle         stomach     Cancer - colorectal No family hx of        Social History:  Marital Status:  Single [1]  Social History     Tobacco Use     Smoking status: Former Smoker     Packs/day: 0.10     Years: 4.00     Pack years: 0.40     Quit date: 12/31/2011     Years since quitting: 10.1     Smokeless tobacco: Never Used     Tobacco comment: Quit 2003   Substance Use Topics     Alcohol use: Not Currently     Alcohol/week: 0.0 standard drinks     Comment: very little     Drug use: No        Medications:    oxyCODONE-acetaminophen (PERCOCET) 5-325 MG tablet  albuterol (VENTOLIN HFA) 108 (90 Base) MCG/ACT inhaler  sertraline (ZOLOFT) 25 MG tablet          Review of Systems all other systems are reviewed and are negative.    Physical Exam   BP: 104/48  Pulse: 72  Temp: 97  F (36.1  C)  Resp: 14  SpO2: 100 %      Physical Exam alert female in mild to moderate stress.  She is quite anxious as she is needle phobic.  Examination of her left hand reveals a 4.0 cm C-shaped laceration between the webspace of the index and middle finger extending onto the palmar aspect of her hand.  She is able to flex and extend against resistance.  Intact sensation.  No active bleeding at this time.  No other injury noted with the incident.    ED Course                 Procedures       Lidocaine with epinephrine was instilled in a  field block.  A total of 2 cc was used.  The wound was irrigated with normal saline.  The wound was closed with 7 4.0 Ethilon sutures interrupted fashion.  Antibiotic and dressing applied.       Critical Care time:  none               No results found for this or any previous visit (from the past 24 hour(s)).    Medications   lido-EPINEPHrine-tetracaine (LET) topical gel GEL (3 mLs Topical Given 3/3/22 1697)       Assessments & Plan (with Medical Decision Making)   Carolina Celeste is a 46 year old female who presents with a laceration to her left nondominant hand.  Patient states she was trying to close the garage door and a piece of plastic with a laceration to the webspace and into the palm between her index and middle finger.  Adequate hemostasis with pressure.  Denies any paresthesias after the injury.  Able to move the digits without difficulty.  Tetanus is updated in 2018.  No other injury with the incident.  On exam she had a laceration that was described repaired as above.  CMS intact prior to and after repair.  Laceration care patient is provided.  Sutures out in 7 to 10 days.  Ibuprofen or Percocet for pain.  Watch for secondary infection.  I have reviewed the nursing notes.    I have reviewed the findings, diagnosis, plan and need for follow up with the patient.       New Prescriptions    OXYCODONE-ACETAMINOPHEN (PERCOCET) 5-325 MG TABLET    Take 1 tablet by mouth every 6 hours as needed for severe pain       Final diagnoses:   Laceration of left hand without foreign body, initial encounter       3/3/2022   [facility] EMERGENCY DEPT     Leonardo Hylton MD  03/03/22 [id number]

## 2022-04-11 DIAGNOSIS — F41.1 GENERALIZED ANXIETY DISORDER: ICD-10-CM

## 2022-04-11 NOTE — TELEPHONE ENCOUNTER
I have not seen her since 2020.  Looks like [facility] saw her a little over a year ago.  It looks like WS changed her medication so she would need a follow-up visit anyway to continue on medication. Her follow was due on 2/2/2022.       Ok for virtual visit.     Rosario Canseco PA-C

## 2022-04-11 NOTE — TELEPHONE ENCOUNTER
"Patient stating she seen Alessandra Ayalajuana in December and was prescribed the zoloft and now she isn't here. Informed she would need to establish care with another provider. Patient voiced \" and I have to pay for another appointment! This is a scam, I shouldn't have to pay. Another provider should be able to read her notes and approve my medication.\" Writer questioned if she is wanting her request sent to Lianne Canseco? Patient stating \" yes.\" Radha Esteves LPN    "

## 2022-04-13 DIAGNOSIS — F41.1 GENERALIZED ANXIETY DISORDER: ICD-10-CM

## 2022-04-13 RX ORDER — SERTRALINE HYDROCHLORIDE 25 MG/1
TABLET, FILM COATED ORAL
Qty: 90 TABLET | Refills: 0 | Status: SHIPPED | OUTPATIENT
Start: 2022-04-13

## 2022-04-13 NOTE — TELEPHONE ENCOUNTER
Called and spoke to the patient and she stated that she will be leaving [place] because she does not want to pay for anther appointment to get another provider to prescribe her medication.

## 2022-04-13 NOTE — TELEPHONE ENCOUNTER
Pending Prescriptions:                       Disp   Refills    sertraline (ZOLOFT) 25 MG tablet [Pharmac*90 tab*0            Sig: TAKE ONE TABLET BY MOUTH ONCE DAILY    Medication is being filled for 1 time chuckie refill only due to:  Patient is due for mood follow-up and est care    Please call and help schedule.  Thank you!

## 2022-04-14 RX ORDER — SERTRALINE HYDROCHLORIDE 25 MG/1
25 TABLET, FILM COATED ORAL DAILY
Qty: 90 TABLET | Refills: 0 | OUTPATIENT
Start: 2022-04-14

## 2022-04-14 NOTE — TELEPHONE ENCOUNTER
Patient needed to be seen for follow-up per her last PCP evaluation at [facility] since she just started this medication. Patient is refusing to follow-up.  Refusing this Rx since patient wants to leave [place]. Patient was offered virtual follow-up and declined.   Rosario Canseco PA-C

## 2022-11-21 ENCOUNTER — HEALTH MAINTENANCE LETTER (OUTPATIENT)
Age: 47
End: 2022-11-21

## 2023-04-16 ENCOUNTER — HEALTH MAINTENANCE LETTER (OUTPATIENT)
Age: 48
End: 2023-04-16

## 2024-02-03 ENCOUNTER — HEALTH MAINTENANCE LETTER (OUTPATIENT)
Age: 49
End: 2024-02-03

## 2024-04-13 NOTE — TELEPHONE ENCOUNTER
Placed at  for signature.  Justyna Clayton MA     This RN attempted to assess and insert IV to start sepsis workup but pt refuses. PT made aware of risk and MD made aware of refusal.

## 2024-06-05 PROCEDURE — 88304 TISSUE EXAM BY PATHOLOGIST: CPT | Mod: 26 | Performed by: PATHOLOGY

## 2024-06-05 PROCEDURE — 88304 TISSUE EXAM BY PATHOLOGIST: CPT | Mod: TC,ORL | Performed by: COLON & RECTAL SURGERY

## 2024-06-06 ENCOUNTER — LAB REQUISITION (OUTPATIENT)
Dept: LAB | Facility: CLINIC | Age: 49
End: 2024-06-06
Payer: COMMERCIAL

## 2024-06-07 LAB
PATH REPORT.COMMENTS IMP SPEC: NORMAL
PATH REPORT.COMMENTS IMP SPEC: NORMAL
PATH REPORT.FINAL DX SPEC: NORMAL
PATH REPORT.GROSS SPEC: NORMAL
PATH REPORT.MICROSCOPIC SPEC OTHER STN: NORMAL
PATH REPORT.RELEVANT HX SPEC: NORMAL
PHOTO IMAGE: NORMAL

## 2024-06-22 ENCOUNTER — HEALTH MAINTENANCE LETTER (OUTPATIENT)
Age: 49
End: 2024-06-22

## 2024-08-22 ENCOUNTER — APPOINTMENT (OUTPATIENT)
Dept: MRI IMAGING | Facility: CLINIC | Age: 49
End: 2024-08-22
Attending: STUDENT IN AN ORGANIZED HEALTH CARE EDUCATION/TRAINING PROGRAM
Payer: COMMERCIAL

## 2024-08-22 ENCOUNTER — APPOINTMENT (OUTPATIENT)
Dept: CT IMAGING | Facility: CLINIC | Age: 49
End: 2024-08-22
Attending: STUDENT IN AN ORGANIZED HEALTH CARE EDUCATION/TRAINING PROGRAM
Payer: COMMERCIAL

## 2024-08-22 ENCOUNTER — HOSPITAL ENCOUNTER (EMERGENCY)
Facility: CLINIC | Age: 49
Discharge: HOME | End: 2024-08-22
Attending: STUDENT IN AN ORGANIZED HEALTH CARE EDUCATION/TRAINING PROGRAM | Admitting: STUDENT IN AN ORGANIZED HEALTH CARE EDUCATION/TRAINING PROGRAM
Payer: COMMERCIAL

## 2024-08-22 VITALS
BODY MASS INDEX: 22.49 KG/M2 | TEMPERATURE: 98.1 F | SYSTOLIC BLOOD PRESSURE: 108 MMHG | OXYGEN SATURATION: 99 % | HEIGHT: 65 IN | DIASTOLIC BLOOD PRESSURE: 74 MMHG | HEART RATE: 62 BPM | RESPIRATION RATE: 14 BRPM | WEIGHT: 135 LBS

## 2024-08-22 DIAGNOSIS — R20.2 NUMBNESS AND TINGLING IN LEFT HAND: Primary | ICD-10-CM

## 2024-08-22 DIAGNOSIS — R20.0 NUMBNESS AND TINGLING IN LEFT HAND: Primary | ICD-10-CM

## 2024-08-22 DIAGNOSIS — G93.89 BRAIN MASS: ICD-10-CM

## 2024-08-22 DIAGNOSIS — H53.9 VISUAL DISTURBANCE: ICD-10-CM

## 2024-08-22 DIAGNOSIS — R51.9 NONINTRACTABLE HEADACHE, UNSPECIFIED CHRONICITY PATTERN, UNSPECIFIED HEADACHE TYPE: ICD-10-CM

## 2024-08-22 LAB
ALBUMIN SERPL BCG-MCNC: 4.6 G/DL (ref 3.5–5.2)
ALP SERPL-CCNC: 69 U/L (ref 40–150)
ALT SERPL W P-5'-P-CCNC: 17 U/L (ref 0–50)
ANION GAP SERPL CALCULATED.3IONS-SCNC: 12 MMOL/L (ref 7–15)
APTT PPP: 29 SECONDS (ref 22–38)
AST SERPL W P-5'-P-CCNC: 16 U/L (ref 0–45)
BASOPHILS # BLD AUTO: 0.1 10E3/UL (ref 0–0.2)
BASOPHILS NFR BLD AUTO: 1 %
BILIRUB SERPL-MCNC: 0.5 MG/DL
BUN SERPL-MCNC: 10.1 MG/DL (ref 6–20)
CALCIUM SERPL-MCNC: 9 MG/DL (ref 8.8–10.4)
CHLORIDE SERPL-SCNC: 102 MMOL/L (ref 98–107)
CREAT SERPL-MCNC: 0.66 MG/DL (ref 0.51–0.95)
EGFRCR SERPLBLD CKD-EPI 2021: >90 ML/MIN/1.73M2
EOSINOPHIL # BLD AUTO: 0.3 10E3/UL (ref 0–0.7)
EOSINOPHIL NFR BLD AUTO: 3 %
ERYTHROCYTE [DISTWIDTH] IN BLOOD BY AUTOMATED COUNT: 12 % (ref 10–15)
GLUCOSE SERPL-MCNC: 91 MG/DL (ref 70–99)
HCO3 SERPL-SCNC: 24 MMOL/L (ref 22–29)
HCT VFR BLD AUTO: 40.8 % (ref 35–47)
HGB BLD-MCNC: 14 G/DL (ref 11.7–15.7)
IMM GRANULOCYTES # BLD: 0.1 10E3/UL
IMM GRANULOCYTES NFR BLD: 1 %
INR PPP: 0.99 (ref 0.85–1.15)
LYMPHOCYTES # BLD AUTO: 1.2 10E3/UL (ref 0.8–5.3)
LYMPHOCYTES NFR BLD AUTO: 12 %
MCH RBC QN AUTO: 29.7 PG (ref 26.5–33)
MCHC RBC AUTO-ENTMCNC: 34.3 G/DL (ref 31.5–36.5)
MCV RBC AUTO: 86 FL (ref 78–100)
MONOCYTES # BLD AUTO: 0.7 10E3/UL (ref 0–1.3)
MONOCYTES NFR BLD AUTO: 7 %
NEUTROPHILS # BLD AUTO: 8.1 10E3/UL (ref 1.6–8.3)
NEUTROPHILS NFR BLD AUTO: 78 %
NRBC # BLD AUTO: 0 10E3/UL
NRBC BLD AUTO-RTO: 0 /100
PLATELET # BLD AUTO: 342 10E3/UL (ref 150–450)
POTASSIUM SERPL-SCNC: 3.9 MMOL/L (ref 3.4–5.3)
PROT SERPL-MCNC: 7.1 G/DL (ref 6.4–8.3)
RBC # BLD AUTO: 4.72 10E6/UL (ref 3.8–5.2)
SODIUM SERPL-SCNC: 138 MMOL/L (ref 135–145)
TROPONIN T SERPL HS-MCNC: <6 NG/L
TSH SERPL DL<=0.005 MIU/L-ACNC: 2.03 UIU/ML (ref 0.3–4.2)
WBC # BLD AUTO: 10.4 10E3/UL (ref 4–11)

## 2024-08-22 PROCEDURE — 250N000009 HC RX 250: Performed by: STUDENT IN AN ORGANIZED HEALTH CARE EDUCATION/TRAINING PROGRAM

## 2024-08-22 PROCEDURE — A9585 GADOBUTROL INJECTION: HCPCS | Performed by: STUDENT IN AN ORGANIZED HEALTH CARE EDUCATION/TRAINING PROGRAM

## 2024-08-22 PROCEDURE — 250N000011 HC RX IP 250 OP 636: Performed by: STUDENT IN AN ORGANIZED HEALTH CARE EDUCATION/TRAINING PROGRAM

## 2024-08-22 PROCEDURE — 36415 COLL VENOUS BLD VENIPUNCTURE: CPT | Performed by: STUDENT IN AN ORGANIZED HEALTH CARE EDUCATION/TRAINING PROGRAM

## 2024-08-22 PROCEDURE — 93005 ELECTROCARDIOGRAM TRACING: CPT

## 2024-08-22 PROCEDURE — 85041 AUTOMATED RBC COUNT: CPT | Performed by: STUDENT IN AN ORGANIZED HEALTH CARE EDUCATION/TRAINING PROGRAM

## 2024-08-22 PROCEDURE — 85610 PROTHROMBIN TIME: CPT | Performed by: STUDENT IN AN ORGANIZED HEALTH CARE EDUCATION/TRAINING PROGRAM

## 2024-08-22 PROCEDURE — 70553 MRI BRAIN STEM W/O & W/DYE: CPT

## 2024-08-22 PROCEDURE — 84484 ASSAY OF TROPONIN QUANT: CPT | Performed by: STUDENT IN AN ORGANIZED HEALTH CARE EDUCATION/TRAINING PROGRAM

## 2024-08-22 PROCEDURE — 96360 HYDRATION IV INFUSION INIT: CPT | Mod: 59

## 2024-08-22 PROCEDURE — 82962 GLUCOSE BLOOD TEST: CPT

## 2024-08-22 PROCEDURE — 99285 EMERGENCY DEPT VISIT HI MDM: CPT | Performed by: STUDENT IN AN ORGANIZED HEALTH CARE EDUCATION/TRAINING PROGRAM

## 2024-08-22 PROCEDURE — 80053 COMPREHEN METABOLIC PANEL: CPT | Performed by: STUDENT IN AN ORGANIZED HEALTH CARE EDUCATION/TRAINING PROGRAM

## 2024-08-22 PROCEDURE — 70450 CT HEAD/BRAIN W/O DYE: CPT | Mod: XU

## 2024-08-22 PROCEDURE — 84443 ASSAY THYROID STIM HORMONE: CPT | Performed by: STUDENT IN AN ORGANIZED HEALTH CARE EDUCATION/TRAINING PROGRAM

## 2024-08-22 PROCEDURE — 258N000003 HC RX IP 258 OP 636: Performed by: STUDENT IN AN ORGANIZED HEALTH CARE EDUCATION/TRAINING PROGRAM

## 2024-08-22 PROCEDURE — 85730 THROMBOPLASTIN TIME PARTIAL: CPT | Performed by: STUDENT IN AN ORGANIZED HEALTH CARE EDUCATION/TRAINING PROGRAM

## 2024-08-22 PROCEDURE — 93010 ELECTROCARDIOGRAM REPORT: CPT | Performed by: STUDENT IN AN ORGANIZED HEALTH CARE EDUCATION/TRAINING PROGRAM

## 2024-08-22 PROCEDURE — 255N000002 HC RX 255 OP 636: Performed by: STUDENT IN AN ORGANIZED HEALTH CARE EDUCATION/TRAINING PROGRAM

## 2024-08-22 PROCEDURE — 99285 EMERGENCY DEPT VISIT HI MDM: CPT | Mod: 25

## 2024-08-22 PROCEDURE — 70496 CT ANGIOGRAPHY HEAD: CPT

## 2024-08-22 RX ORDER — GADOBUTROL 604.72 MG/ML
7.5 INJECTION INTRAVENOUS ONCE
Status: COMPLETED | OUTPATIENT
Start: 2024-08-22 | End: 2024-08-22

## 2024-08-22 RX ORDER — LEVETIRACETAM 750 MG/1
750 TABLET ORAL 2 TIMES DAILY
Qty: 60 TABLET | Refills: 0 | Status: SHIPPED | OUTPATIENT
Start: 2024-08-22

## 2024-08-22 RX ORDER — IOPAMIDOL 755 MG/ML
500 INJECTION, SOLUTION INTRAVASCULAR ONCE
Status: COMPLETED | OUTPATIENT
Start: 2024-08-22 | End: 2024-08-22

## 2024-08-22 RX ADMIN — SODIUM CHLORIDE 1000 ML: 9 INJECTION, SOLUTION INTRAVENOUS at 18:14

## 2024-08-22 RX ADMIN — GADOBUTROL 6 ML: 604.72 INJECTION INTRAVENOUS at 19:39

## 2024-08-22 RX ADMIN — IOPAMIDOL 67 ML: 755 INJECTION, SOLUTION INTRAVENOUS at 17:26

## 2024-08-22 RX ADMIN — SODIUM CHLORIDE 100 ML: 9 INJECTION, SOLUTION INTRAVENOUS at 17:26

## 2024-08-22 ASSESSMENT — ENCOUNTER SYMPTOMS
DIZZINESS: 0
VOMITING: 0
DIARRHEA: 0
MYALGIAS: 0
EYE REDNESS: 0
HEADACHES: 0
NECK STIFFNESS: 0
ARTHRALGIAS: 0
NUMBNESS: 1
ACTIVITY CHANGE: 0
RHINORRHEA: 0
CHILLS: 0
SHORTNESS OF BREATH: 0
FATIGUE: 0
APPETITE CHANGE: 0
FEVER: 0
DYSURIA: 0
HEMATURIA: 0
COUGH: 0
NAUSEA: 0
SORE THROAT: 0
ABDOMINAL PAIN: 0

## 2024-08-22 ASSESSMENT — ACTIVITIES OF DAILY LIVING (ADL)
ADLS_ACUITY_SCORE: 35

## 2024-08-22 ASSESSMENT — COLUMBIA-SUICIDE SEVERITY RATING SCALE - C-SSRS
2. HAVE YOU ACTUALLY HAD ANY THOUGHTS OF KILLING YOURSELF IN THE PAST MONTH?: NO
6. HAVE YOU EVER DONE ANYTHING, STARTED TO DO ANYTHING, OR PREPARED TO DO ANYTHING TO END YOUR LIFE?: NO
1. IN THE PAST MONTH, HAVE YOU WISHED YOU WERE DEAD OR WISHED YOU COULD GO TO SLEEP AND NOT WAKE UP?: NO

## 2024-08-22 NOTE — ED PROVIDER NOTES
History     Chief Complaint   Patient presents with    Stroke Symptoms     HPI  Carolina Celeste is a 48 year old female who with sudden onset left hand numbness at 11 this afternoon followed by some visual disturbance and headache.  She also notes some left foot numbness.  She has not had any weakness nausea vomiting chest pain she will be short of breath or cough.  She has no new medications.  She has a history of general anxiety disorder, tobacco dependence but otherwise has been healthy does not take any current medication aside from daily antihistamines.  Denies any alcohol or tobacco dependence that she denies any drug abuse history.  She notes that her younger sister recently had a TIA event.  She has no cardiac condition or history of similar events in the past.  Notes that her vision feels like it is delayed as well as having slight blurriness.  She also feels having hot flashes.  She has no chronic migraine history headache history.    Allergies:  Allergies   Allergen Reactions    Sunflower Seed [Sunflower Oil]      Throat itching, wheezing    Latex        Problem List:    Patient Active Problem List    Diagnosis Date Noted    Sleep disorder 03/04/2021     Priority: Medium    Malaise and fatigue 03/04/2021     Priority: Medium    Lymphadenopathy 03/04/2021     Priority: Medium    Perimenopausal symptoms 11/27/2019     Priority: Medium    Insomnia, unspecified type 11/27/2019     Priority: Medium    History of tobacco use, presenting hazards to health 11/03/2018     Priority: Medium    History of hysterectomy leaving cervix intact 11/29/2017     Priority: Medium    Immunization not carried out because of patient refusal 04/30/2014     Priority: Medium    Intermittent asthma, uncomplicated 01/14/2014     Priority: Medium    Generalized anxiety disorder 01/13/2014     Priority: Medium     Diagnosis updated by automated process. Provider to review and confirm.      Allergic rhinitis 09/07/2006     Priority:  Medium     Problem list name updated by automated process. Provider to review          Past Medical History:    Past Medical History:   Diagnosis Date    Allergic rhinitis, cause unspecified 2006    Anxiety     Dysmenorrhea     Mild intermittent asthma 2006       Past Surgical History:    Past Surgical History:   Procedure Laterality Date    LAPAROSCOPY,VAG HYSTERECTOMY  2009    supracervical hyst.       Family History:    Family History   Problem Relation Age of Onset    Lipids Father     Hypertension Father     Psychotic Disorder Father         [place]    Neurologic Disorder Maternal Grandmother         Parkinsons    Depression Maternal Grandmother     Hypertension Maternal Grandmother     Thyroid Disease Maternal Grandmother     Alzheimer Disease Maternal Grandfather     Genitourinary Problems Paternal Grandfather         Cirrohosis of [place]    Breast Cancer Other     Cancer Maternal Uncle         Stomach/Esophagus    Breast Cancer Maternal Aunt     Cancer Maternal Uncle         stomach    Cancer - colorectal No family hx of        Social History:  Marital Status:   [2]  Social History     Tobacco Use    Smoking status: Former     Current packs/day: 0.00     Average packs/day: 0.1 packs/day for 4.0 years (0.4 ttl pk-yrs)     Types: Cigarettes     Start date: 2007     Quit date: 2011     Years since quittin.6    Smokeless tobacco: Never    Tobacco comments:     Quit    Substance Use Topics    Alcohol use: Not Currently     Alcohol/week: 0.0 standard drinks of alcohol     Comment: very little    Drug use: No        Medications:    levETIRAcetam (KEPPRA) 750 MG tablet  albuterol (VENTOLIN HFA) 108 (90 Base) MCG/ACT inhaler  sertraline (ZOLOFT) 25 MG tablet          Review of Systems   Constitutional:  Negative for activity change, appetite change, chills, fatigue and fever.   HENT:  Negative for congestion, rhinorrhea and sore throat.    Eyes:  Positive for visual disturbance.  "Negative for redness.   Respiratory:  Negative for cough and shortness of breath.    Cardiovascular:  Negative for chest pain.   Gastrointestinal:  Negative for abdominal pain, diarrhea, nausea and vomiting.   Genitourinary:  Negative for dysuria and hematuria.   Musculoskeletal:  Negative for arthralgias, myalgias and neck stiffness.   Skin:  Negative for rash.   Neurological:  Positive for numbness. Negative for dizziness and headaches.        Left upper and lower extremity   All other systems reviewed and are negative.      Physical Exam   BP: (!) 140/90  Pulse: 88  Temp: 98.1  F (36.7  C)  Resp: 16  Height: 165.1 cm (5' 5\")  Weight: 61.6 kg (135 lb 11.2 oz)  SpO2: 99 %      Physical Exam  Vitals and nursing note reviewed.   Constitutional:       General: She is not in acute distress.     Appearance: Normal appearance. She is normal weight. She is not ill-appearing, toxic-appearing or diaphoretic.      Comments: Patient feels that she has had hot flashes.  Mildly anxious   HENT:      Head: Normocephalic and atraumatic.      Right Ear: Tympanic membrane normal.      Left Ear: Tympanic membrane normal.      Mouth/Throat:      Mouth: Mucous membranes are moist.   Eyes:      General: No scleral icterus.     Conjunctiva/sclera: Conjunctivae normal.   Cardiovascular:      Rate and Rhythm: Normal rate and regular rhythm.      Heart sounds: Normal heart sounds.   Pulmonary:      Effort: Pulmonary effort is normal. No respiratory distress.      Breath sounds: Normal breath sounds. No wheezing or rales.   Abdominal:      General: Abdomen is flat. Bowel sounds are normal.      Palpations: Abdomen is soft.   Musculoskeletal:         General: Normal range of motion.      Cervical back: Neck supple.      Right lower leg: No edema.      Left lower leg: No edema.   Skin:     General: Skin is warm.      Findings: No rash.   Neurological:      General: No focal deficit present.      Mental Status: She is alert and oriented to " person, place, and time.      Cranial Nerves: No cranial nerve deficit.      Sensory: Sensory deficit (left hand notes feels numb) present.      Motor: No weakness.      Coordination: Coordination normal.      Gait: Gait normal.      Deep Tendon Reflexes: Reflexes normal.   Psychiatric:         Mood and Affect: Mood normal.         Behavior: Behavior normal.         Thought Content: Thought content normal.         ED Course        Procedures         EKG self interpreted at 5:40 PM.  Normal sinus rhythm at 72 bpm, QTc at 427 CO interval is 172 and a QRS of 92.  Normal EKG.    Critical Care time:  was 80 minutes for this patient excluding procedures.         Results for orders placed or performed during the hospital encounter of 08/22/24 (from the past 24 hour(s))   Head CT w/o contrast    Narrative    EXAM: CT HEAD W/O CONTRAST  LOCATION: [facility]  DATE: 8/22/2024    INDICATION: Vision changes, left arm numbness.  COMPARISON: None.  TECHNIQUE: Routine CT Head without IV contrast. Multiplanar reformats. Dose reduction techniques were used.    FINDINGS:  INTRACRANIAL CONTENTS: No acute hemorrhage, extra axial fluid collection or mass effect. There is an apparent cystic lesion identified within the atria of the right lateral ventricle measuring proximal a 1.7 x 1.2 cm in greatest axial dimensions. No CT   evidence of acute infarct. Normal parenchymal attenuation. Normal ventricles and sulci.     VISUALIZED ORBITS/SINUSES/MASTOIDS: No intraorbital abnormality. No paranasal sinus mucosal disease. No middle ear or mastoid effusion.    BONES/SOFT TISSUES: No acute abnormality.      Impression    IMPRESSION:  1.  No acute intracranial process. Specifically, no acute hemorrhage or acute large territory infarction. If the patient is experiencing an acute, focal or ongoing neurologic deficit, an MRI may be indicated.  2.  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 This can be further assessed with MRI.          Dr. Aydin Miller discussed results with Dr. Hernandez on 8/22/2024 at 5:41 PM CDT.         INR   Result Value Ref Range    INR 0.99 0.85 - 1.15   Partial thromboplastin time   Result Value Ref Range    aPTT 29 22 - 38 Seconds   Comprehensive metabolic panel   Result Value Ref Range    Sodium 138 135 - 145 mmol/L    Potassium 3.9 3.4 - 5.3 mmol/L    Carbon Dioxide (CO2) 24 22 - 29 mmol/L    Anion Gap 12 7 - 15 mmol/L    Urea Nitrogen 10.1 6.0 - 20.0 mg/dL    Creatinine 0.66 0.51 - 0.95 mg/dL    GFR Estimate >90 >60 mL/min/1.73m2    Calcium 9.0 8.8 - 10.4 mg/dL    Chloride 102 98 - 107 mmol/L    Glucose 91 70 - 99 mg/dL    Alkaline Phosphatase 69 40 - 150 U/L    AST 16 0 - 45 U/L    ALT 17 0 - 50 U/L    Protein Total 7.1 6.4 - 8.3 g/dL    Albumin 4.6 3.5 - 5.2 g/dL    Bilirubin Total 0.5 <=1.2 mg/dL   Troponin T, High Sensitivity   Result Value Ref Range    Troponin T, High Sensitivity <6 <=14 ng/L   TSH with free T4 reflex   Result Value Ref Range    TSH 2.03 0.30 - 4.20 uIU/mL   CTA Head Neck with Contrast    Narrative    EXAM: CTA HEAD NECK W CONTRAST  LOCATION: [facility]  DATE: 8/22/2024    INDICATION: Stroke rule out.  COMPARISON: None.  CONTRAST: Isovue-370, 67 mL.  TECHNIQUE: Head and neck CT angiogram with IV contrast. Axial helical CT images of the head and neck vessels obtained during the arterial phase of intravenous contrast administration. Axial 2D reconstructed images and multiplanar 3D MIP reconstructed   images of the head and neck vessels were performed by the technologist. Dose reduction techniques were used. All stenosis measurements made according to NASCET criteria unless otherwise specified.    FINDINGS:     HEAD CTA:  ANTERIOR CIRCULATION: No proximal large vessel occlusion. No high-grade stenosis. Along the distal/inferior  aspect of the right supraclinoid ICA there is a small lobular outpouching measuring 1 mm in AP dimensions, 1 mm in transverse dimensions and 1 mm   in craniocaudal dimensions, possibly reflecting a small infundibular origin of the right posterior communicating artery (although no branching apical artery is identified) versus tiny aneurysm. These findings are best seen on image 395 series 6. Small   funnel-shaped outpouching along the undersurface of the left supraclinoid ICA (image 47 series 4) measuring approximately 2 mm in AP dimensions, 1 mm in craniocaudal dimensions and 1 mm in transverse dimensions (image 393 series 6 and image 47 series 12)   likely reflects a small infundibular origin of the left posterior communicating artery. Standard [place] anatomy.    POSTERIOR CIRCULATION: No stenosis/occlusion, aneurysm, or high flow vascular malformation. Balanced vertebral arteries supply a normal basilar artery.     DURAL VENOUS SINUSES: Expected enhancement of the major dural venous sinuses.    NECK CTA:  RIGHT CAROTID: No measurable stenosis or dissection. Tortuous ICA.    LEFT CAROTID: No measurable stenosis or dissection. Tortuous ICA.    VERTEBRAL ARTERIES: No focal stenosis or dissection. Balanced vertebral arteries.    AORTIC ARCH: Classic aortic arch anatomy with no significant stenosis at the origin of the great vessels.    NONVASCULAR STRUCTURES: Unremarkable.      Impression    IMPRESSION:   HEAD CTA:   1.  No significant stenosis, large vessel occlusion, or high-flow vascular malformation.  2.  Small lobular outpouching along the undersurface of the distal right supraclinoid ICA measuring up to 1 mm likely reflects a small infundibular origin of the right posterior communicating artery versus tiny aneurysm.  3.  Small infundibular origin of left posterior communicating artery.    NECK CTA:  1.  No significant stenosis or dissection.                Dr. Aydin Miller discussed results with  Dr. Hernandez on 8/20/2024 at 6:34 PM CDT.   CBC with platelets differential    Narrative    The following orders were created for panel order CBC with platelets differential.  Procedure                               Abnormality         Status                     ---------                               -----------         ------                     CBC with platelets and d...[[id number]]                      Final result                 Please view results for these tests on the individual orders.   CBC with platelets and differential   Result Value Ref Range    WBC Count 10.4 4.0 - 11.0 10e3/uL    RBC Count 4.72 3.80 - 5.20 10e6/uL    Hemoglobin 14.0 11.7 - 15.7 g/dL    Hematocrit 40.8 35.0 - 47.0 %    MCV 86 78 - 100 fL    MCH 29.7 26.5 - 33.0 pg    MCHC 34.3 31.5 - 36.5 g/dL    RDW 12.0 10.0 - 15.0 %    Platelet Count 342 150 - 450 10e3/uL    % Neutrophils 78 %    % Lymphocytes 12 %    % Monocytes 7 %    % Eosinophils 3 %    % Basophils 1 %    % Immature Granulocytes 1 %    NRBCs per 100 WBC 0 <1 /100    Absolute Neutrophils 8.1 1.6 - 8.3 10e3/uL    Absolute Lymphocytes 1.2 0.8 - 5.3 10e3/uL    Absolute Monocytes 0.7 0.0 - 1.3 10e3/uL    Absolute Eosinophils 0.3 0.0 - 0.7 10e3/uL    Absolute Basophils 0.1 0.0 - 0.2 10e3/uL    Absolute Immature Granulocytes 0.1 <=0.4 10e3/uL    Absolute NRBCs 0.0 10e3/uL   MR Brain w/o & w Contrast    Narrative    MR BRAIN W/O and W CONTRAST  [facility]  8/22/2024 7:50 PM CDT    INDICATION: Transient ischemic attack. Sudden onset left hand numbness, followed by visual disturbance and headache.  TECHNIQUE: Noncontrast and contrast enhanced MRI of the brain.  CONTRAST: 6 mL Gadavist  COMPARISON: Head and neck CTA 8/22/2024    FINDINGS: There is no restricted diffusion. Paranasal sinuses are free from significant disease. Mastoid air cells appear free from significant disease. Intraorbital contents are unremarkable. Incidental intraventricular arachnoid cyst in  the right   ventricular atrium corresponding to the abnormality described on CT. Otherwise normal ventricles. Intracranial flow voids are intact. There is a 1.5 to 2 cm area of cortical and subcortical T2/FLAIR hyperintensity at the lateral right temporal parietal   junction (axial FLAIR image 17 series 6). No associated diffusion, gradient abnormality or enhancement. No evidence for acute or chronic intracranial blood products.      Impression    IMPRESSION:   1.  1.5 to 2 cm area of abnormal signal within the right temporoparietal cortex may represent a cortical dysplasia or low-grade glioma, but is not specific. Follow-up is recommended.  2.  No evidence for recent infarct, hemorrhage or hydrocephalus.  3.  Incidental right ventricular atrium arachnoid cyst.           Medications   sodium chloride 0.9% BOLUS 1,000 mL (0 mLs Intravenous Stopped 8/22/24 1914)   iopamidol (ISOVUE-370) solution 500 mL (67 mLs Intravenous $Given 8/22/24 1726)   sodium chloride 0.9 % bag 100mL for CT scan flush use (100 mLs Intravenous $Given 8/22/24 1726)   gadobutrol (GADAVIST) injection 7.5 mL (6 mLs Intravenous $Given 8/22/24 1939)       Assessments & Plan (with Medical Decision Making)     I have reviewed the nursing notes.    I have reviewed the findings, diagnosis, plan and need for follow up with the patient.      Medical Decision Making  Pleasant 48-year-old female presenting with headache, visual disturbance, left hand and leg numbness as well as right calf pain and hot flashes.  No shortness of breath chest pains cough recent illness or recent trauma.  Blood pressure with a mild hypertension 140/90, temperature stable at 98.1 and pulse of 88 with oxygen saturation 99% on room air.  NIHS score 1 secondary to left arm and foot numbness/sensation changes.  No other acute signs of neurological deficits on examination.  Maintain as a tier 2 secondary to this being greater than 4 and half hours from the event and however symptoms  are still present.  Discussed case with neuro on-call.  Recommended imaging of head as well as MRI.  Orders placed.    Patient CBC is unremarkable TSH stable troponin less than 6 EKG unremarkable and CMP reassuring with no signs of INR abnormalities or PTT abnormalities.  CT CT angio head and neck all done and reassuring with no acute significant fine aside from some incidental findings.  Her MRI of her brain with without contrast does show a mass at the parietotemporal lobe that is approximate 2 to 3 cm in diameter/size concerning for minor glioma versus cortical dysplasia.  Neuro with low concern of stroke involvement at this point but concerned that this can contribute to patient having seizures in the future.  They recommend general neurology consult as well as neurosurgery consult with outpatient EEG.  Did not recommend any medication as patient not actively having any history of seizures or recent seizures but patient's symptoms could be focal seizures in nature therefore general neurology consulted and pending.  Case discussed with neurosurgery who notes they will follow-up with patient outpatient in the next upcoming days for outpatient follow-up appointment to discuss the diagnosis and mass/imaging.    Discussed with Dr. Davis that the general neurology consult still pending and to call patient in regards to recommendations to start or hold Keppra at this point and outpatient follow-up recommendations.  Consults already been placed otherwise and Keppra sent to patient's pharmacy.  Discussed extensively with patient in regards to all the imaging and lab work that is completed today as well as the importance of if this is seizure like pathology that occurred today that patient's had high risk for various concerns in regards to life factors including swimming and caring for others including driving and work and various implications but this needs to be further addressed with neurology and confirmed before any  diagnosis can be made.  Patient and  are happy with this plan.  Patient discharged home      New Prescriptions    LEVETIRACETAM (KEPPRA) 750 MG TABLET    Take 1 tablet (750 mg) by mouth 2 times daily.       Final diagnoses:   Numbness and tingling in left hand   Visual disturbance   Brain mass   Nonintractable headache, unspecified chronicity pattern, unspecified headache type       8/22/2024   [facility] EMERGENCY DEPT       Elroy Hernandez MD  08/22/24 2224       Elroy Hernandez MD  08/22/24 2222

## 2024-08-22 NOTE — CONSULTS
"  [facility]    Stroke Telephone Note    I was called by Elroy Hernandez on 08/22/24 regarding patient Carolina Celeste. The patient is a 48 year old female who presented with acute onset of severe headache, neck pain, vision feeling not right, and stroke code was activated because of left side numbness.  Last known well was 11 AM.    Vitals  BP: 122/77   Pulse: 73   Resp: 14   Temp: 98.1  F (36.7  C)   Weight: 61.2 kg (135 lb)    Stroke Code Data (for stroke code without tele)  Stroke code activated 08/22/24  1718   Stroke provider first response 08/22/24  1718   Last known normal 08/22/24  1100      Time of discovery (or onset of symptoms) 08/22/24  1100   Head CT read by Stroke Neuro Provider 08/22/24  1800   Was stroke code de-escalated? Yes  08/22/24  1816     Imaging Findings  CT head: No acute intracranial hemorrhage or pathology  CTA head/neck: No large vessel occlusion    Intravenous Thrombolysis  Not given due to:   - unclear or unfavorable risk-benefit profile for extended window thrombolysis beyond the conventional 4.5 hour time window    Endovascular Treatment  Not initiated due to absence of proximal vessel occlusion    Impression  Acute onset of left side numbness and headache  Stroke versus complex migraine    Recommendations   MRI of the brain with and without contrast    My recommendations are based on the information provided over the phone by Carolina Celeste's in-person providers. They are not intended to replace the clinical judgment of her in-person providers. I was not requested to personally see or examine the patient at this time.     The Stroke Staff is Dr. Mcguire.    Myranda Ernandez MD  Vascular Neurology Fellow    To page me or covering stroke neurology team member, click here: [email or web address]  Choose \"On Call\" tab at top, then select \"NEUROLOGY/ALL SITES\" from middle drop-down box, press Enter, then look for \"stroke\" or \"telestroke\" for your site.    "

## 2024-08-22 NOTE — ED TRIAGE NOTES
"Pt presents with left arm numbness that started at 1110 today. Pt states had severe headache and neck pain also. Pt states vision feels not right. Pt mentions \"sweating.\" Pt alert and orientated. Pt also states left foot numbness and right calf pain. Pt walking without difficulty. Code stroke called. Pt mentions she is on estrogen patch.        "

## 2024-08-22 NOTE — Clinical Note
Carolina Celeste was seen and treated in our emergency department on 8/22/2024.  She may return to work on 08/26/2024.       If you have any questions or concerns, please don't hesitate to call.      Elroy Hernandez MD

## 2024-08-23 NOTE — ED NOTES
Rounded on pt. Moved to ED 09. VS reassessed. Pt is waiting for update in plan of care with neuro consult.

## 2024-08-23 NOTE — PROGRESS NOTES
"  48 year old female who presented with acute onset of severe headache, neck pain, left arm numbness that started at 1110 today. I was called about MRI results with showed findings concerning of L temporal cortical dysplasia vs low-grade glioma but no acute stroke.        Recs  EEG  Neurology and neurosurgery follow up    My recommendations are based on the information provided over the phone by  in-person providers. They are not intended to replace the clinical judgment of her in-person providers. I was not requested to personally see or examine the patient at this time.      The Stroke Staff is Dr. Mcguire.     Oren Smallwood MD  Vascular Neurology Fellow     To page me or covering stroke neurology team member, click here: AMCOM  Choose \"On Call\" tab at top, then select \"NEUROLOGY/ALL SITES\" from middle drop-down box, press Enter, then look for \"stroke\" or \"telestroke\" for your site.   "

## 2024-08-23 NOTE — PROGRESS NOTES
Contacted regarding 47 yo female presenting to ER with headache, left arm and leg numbness now resolved.    Imaging reveals right temporoparietal abnormality thought to be cortical dysplasia versus glioma.    Imaging:    Head CT:    IMPRESSION:  1.  No acute intracranial process. Specifically, no acute hemorrhage or acute large territory infarction. If the patient is experiencing an acute, focal or ongoing neurologic deficit, an MRI may be indicated.  2.  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 This can be further assessed with MRI.     Brain MRI:    IMPRESSION:   1.  1.5 to 2 cm area of abnormal signal within the right temporoparietal cortex may represent a cortical dysplasia or low-grade glioma, but is not specific. Follow-up is recommended.  2.  No evidence for recent infarct, hemorrhage or hydrocephalus.  3.  Incidental right ventricular atrium arachnoid cyst.     RECOMMENDATIONS:  Symptoms likely related to seizure, recommend general neurology comment on recommendations regarding Keppra and follow up plan.    In regard to findings on brain MRI, okay to discharge from NS standpoint.  Our office will contact patient to arrange appt in our NS office.    RAISA Sullivan  [facility]  [address] [address]    Tel [phone]  Pager [phone]

## 2024-08-23 NOTE — DISCHARGE INSTRUCTIONS
You were seen today for left hand numbness and left foot numbness as well as headache and found to have a small brain mass as discussed below in your MRI findings.  A consult for general neurology as well as neurosurgery has been placed to follow-up in regards to this.  The mass as increased risk of seizure disorders and your symptoms could be consistent with focal seizures versus atypical migraine however do not see any acute signs of stroke at this time.  We recommend ensuring that you monitor your symptoms closely and if any new symptoms present please return for reevaluation otherwise please follow-up with the noted consults.    FINDINGS: There is no restricted diffusion. Paranasal sinuses are free from significant disease. Mastoid air cells appear free from significant disease. Intraorbital contents are unremarkable. Incidental intraventricular arachnoid cyst in the right   ventricular atrium corresponding to the abnormality described on CT. Otherwise normal ventricles. Intracranial flow voids are intact. There is a 1.5 to 2 cm area of cortical and subcortical T2/FLAIR hyperintensity at the lateral right temporal parietal   junction (axial FLAIR image 17 series 6). No associated diffusion, gradient abnormality or enhancement. No evidence for acute or chronic intracranial blood products.                                                                     IMPRESSION:   1.  1.5 to 2 cm area of abnormal signal within the right temporoparietal cortex may represent a cortical dysplasia or low-grade glioma, but is not specific. Follow-up is recommended.  2.  No evidence for recent infarct, hemorrhage or hydrocephalus.  3.  Incidental right ventricular atrium arachnoid cyst.

## 2024-08-27 ENCOUNTER — TELEPHONE (OUTPATIENT)
Dept: NEUROSURGERY | Facility: CLINIC | Age: 49
End: 2024-08-27
Payer: COMMERCIAL

## 2024-08-27 NOTE — TELEPHONE ENCOUNTER
Patient was called for scheduling with Dr. He.  Patient stated is being seen outside of [place].

## 2024-08-30 LAB — GLUCOSE BLDC GLUCOMTR-MCNC: 95 MG/DL (ref 70–99)

## 2025-07-12 ENCOUNTER — HEALTH MAINTENANCE LETTER (OUTPATIENT)
Age: 50
End: 2025-07-12